# Patient Record
Sex: MALE | Race: WHITE | Employment: OTHER | URBAN - METROPOLITAN AREA
[De-identification: names, ages, dates, MRNs, and addresses within clinical notes are randomized per-mention and may not be internally consistent; named-entity substitution may affect disease eponyms.]

---

## 2018-01-01 ENCOUNTER — APPOINTMENT (OUTPATIENT)
Dept: GENERAL RADIOLOGY | Age: 78
DRG: 207 | End: 2018-01-01
Attending: HOSPITALIST
Payer: MEDICARE

## 2018-01-01 ENCOUNTER — APPOINTMENT (OUTPATIENT)
Dept: GENERAL RADIOLOGY | Age: 78
DRG: 207 | End: 2018-01-01
Attending: INTERNAL MEDICINE
Payer: MEDICARE

## 2018-01-01 ENCOUNTER — APPOINTMENT (OUTPATIENT)
Dept: CT IMAGING | Age: 78
DRG: 207 | End: 2018-01-01
Attending: INTERNAL MEDICINE
Payer: MEDICARE

## 2018-01-01 ENCOUNTER — APPOINTMENT (OUTPATIENT)
Dept: GENERAL RADIOLOGY | Age: 78
DRG: 207 | End: 2018-01-01
Attending: FAMILY MEDICINE
Payer: MEDICARE

## 2018-01-01 ENCOUNTER — APPOINTMENT (OUTPATIENT)
Dept: CT IMAGING | Age: 78
DRG: 207 | End: 2018-01-01
Attending: EMERGENCY MEDICINE
Payer: MEDICARE

## 2018-01-01 ENCOUNTER — APPOINTMENT (OUTPATIENT)
Dept: GENERAL RADIOLOGY | Age: 78
DRG: 207 | End: 2018-01-01
Attending: EMERGENCY MEDICINE
Payer: MEDICARE

## 2018-01-01 ENCOUNTER — HOSPITAL ENCOUNTER (INPATIENT)
Age: 78
LOS: 7 days | DRG: 207 | End: 2018-04-12
Attending: EMERGENCY MEDICINE | Admitting: INTERNAL MEDICINE
Payer: MEDICARE

## 2018-01-01 ENCOUNTER — APPOINTMENT (OUTPATIENT)
Dept: GENERAL RADIOLOGY | Age: 78
DRG: 207 | End: 2018-01-01
Attending: ANESTHESIOLOGY
Payer: MEDICARE

## 2018-01-01 ENCOUNTER — APPOINTMENT (OUTPATIENT)
Dept: MRI IMAGING | Age: 78
DRG: 207 | End: 2018-01-01
Attending: PSYCHIATRY & NEUROLOGY
Payer: MEDICARE

## 2018-01-01 VITALS
WEIGHT: 150.8 LBS | DIASTOLIC BLOOD PRESSURE: 54 MMHG | HEART RATE: 73 BPM | BODY MASS INDEX: 19.99 KG/M2 | OXYGEN SATURATION: 52 % | RESPIRATION RATE: 14 BRPM | HEIGHT: 73 IN | SYSTOLIC BLOOD PRESSURE: 110 MMHG | TEMPERATURE: 98.3 F

## 2018-01-01 DIAGNOSIS — I95.9 HYPOTENSION, UNSPECIFIED HYPOTENSION TYPE: ICD-10-CM

## 2018-01-01 DIAGNOSIS — R91.8 BILATERAL PULMONARY INFILTRATES ON CHEST X-RAY: ICD-10-CM

## 2018-01-01 DIAGNOSIS — J18.9 PNEUMONIA DUE TO INFECTIOUS ORGANISM, UNSPECIFIED LATERALITY, UNSPECIFIED PART OF LUNG: ICD-10-CM

## 2018-01-01 DIAGNOSIS — G93.40 ENCEPHALOPATHY: ICD-10-CM

## 2018-01-01 DIAGNOSIS — E87.79 OTHER HYPERVOLEMIA: ICD-10-CM

## 2018-01-01 DIAGNOSIS — R78.81 BACTEREMIA: ICD-10-CM

## 2018-01-01 DIAGNOSIS — J96.01 ACUTE RESPIRATORY FAILURE WITH HYPOXIA (HCC): ICD-10-CM

## 2018-01-01 DIAGNOSIS — I46.9 CARDIAC ARREST (HCC): ICD-10-CM

## 2018-01-01 DIAGNOSIS — J18.9 PNEUMONIA OF RIGHT LOWER LOBE DUE TO INFECTIOUS ORGANISM: ICD-10-CM

## 2018-01-01 DIAGNOSIS — J93.9 PNEUMOTHORAX ON LEFT: ICD-10-CM

## 2018-01-01 DIAGNOSIS — J90 PLEURAL EFFUSION: Primary | ICD-10-CM

## 2018-01-01 LAB
ALBUMIN SERPL-MCNC: 2.3 G/DL (ref 3.2–4.6)
ALBUMIN SERPL-MCNC: 3.1 G/DL (ref 3.2–4.6)
ALBUMIN/GLOB SERPL: 0.9 {RATIO} (ref 1.2–3.5)
ALP SERPL-CCNC: 83 U/L (ref 50–136)
ALT SERPL-CCNC: 9 U/L (ref 12–65)
AMMONIA PLAS-SCNC: <10 UMOL/L (ref 11–32)
ANION GAP SERPL CALC-SCNC: 10 MMOL/L (ref 7–16)
ANION GAP SERPL CALC-SCNC: 10 MMOL/L (ref 7–16)
ANION GAP SERPL CALC-SCNC: 11 MMOL/L (ref 7–16)
ANION GAP SERPL CALC-SCNC: 12 MMOL/L (ref 7–16)
ANION GAP SERPL CALC-SCNC: 13 MMOL/L (ref 7–16)
ANION GAP SERPL CALC-SCNC: 13 MMOL/L (ref 7–16)
ANION GAP SERPL CALC-SCNC: 9 MMOL/L (ref 7–16)
APPEARANCE UR: CLEAR
ARTERIAL PATENCY WRIST A: ABNORMAL
ARTERIAL PATENCY WRIST A: POSITIVE
AST SERPL-CCNC: 18 U/L (ref 15–37)
BACTERIA SPEC CULT: ABNORMAL
BACTERIA SPEC CULT: NORMAL
BACTERIA SPEC CULT: NORMAL
BACTERIA URNS QL MICRO: 0 /HPF
BASE DEFICIT BLDA-SCNC: 0.3 MMOL/L (ref 0–2)
BASE DEFICIT BLDA-SCNC: 1.1 MMOL/L (ref 0–2)
BASE DEFICIT BLDA-SCNC: 1.6 MMOL/L (ref 0–2)
BASE DEFICIT BLDA-SCNC: 1.8 MMOL/L (ref 0–2)
BASE DEFICIT BLDA-SCNC: 10.7 MMOL/L (ref 0–2)
BASE DEFICIT BLDA-SCNC: 2.4 MMOL/L (ref 0–2)
BASE DEFICIT BLDA-SCNC: 3.2 MMOL/L (ref 0–2)
BASE DEFICIT BLDA-SCNC: 4.8 MMOL/L (ref 0–2)
BASE DEFICIT BLDA-SCNC: 8 MMOL/L (ref 0–2)
BASE EXCESS BLDA CALC-SCNC: 0 MMOL/L (ref 0–3)
BASE EXCESS BLDA CALC-SCNC: 0.7 MMOL/L (ref 0–3)
BASOPHILS # BLD: 0 K/UL (ref 0–0.2)
BASOPHILS NFR BLD: 0 % (ref 0–2)
BDY SITE: ABNORMAL
BILIRUB SERPL-MCNC: 1 MG/DL (ref 0.2–1.1)
BILIRUB UR QL: ABNORMAL
BNP SERPL-MCNC: 673 PG/ML
BNP SERPL-MCNC: 699 PG/ML
BNP SERPL-MCNC: 775 PG/ML
BUN SERPL-MCNC: 35 MG/DL (ref 8–23)
BUN SERPL-MCNC: 36 MG/DL (ref 8–23)
BUN SERPL-MCNC: 38 MG/DL (ref 8–23)
BUN SERPL-MCNC: 46 MG/DL (ref 8–23)
BUN SERPL-MCNC: 59 MG/DL (ref 8–23)
BUN SERPL-MCNC: 69 MG/DL (ref 8–23)
BUN SERPL-MCNC: 74 MG/DL (ref 8–23)
BUN SERPL-MCNC: 77 MG/DL (ref 8–23)
BUN SERPL-MCNC: 91 MG/DL (ref 8–23)
CALCIUM SERPL-MCNC: 7.2 MG/DL (ref 8.3–10.4)
CALCIUM SERPL-MCNC: 7.3 MG/DL (ref 8.3–10.4)
CALCIUM SERPL-MCNC: 7.4 MG/DL (ref 8.3–10.4)
CALCIUM SERPL-MCNC: 7.5 MG/DL (ref 8.3–10.4)
CALCIUM SERPL-MCNC: 7.9 MG/DL (ref 8.3–10.4)
CALCIUM SERPL-MCNC: 8 MG/DL (ref 8.3–10.4)
CALCIUM SERPL-MCNC: 8 MG/DL (ref 8.3–10.4)
CALCIUM SERPL-MCNC: 8.1 MG/DL (ref 8.3–10.4)
CALCIUM SERPL-MCNC: 9.3 MG/DL (ref 8.3–10.4)
CHLORIDE SERPL-SCNC: 102 MMOL/L (ref 98–107)
CHLORIDE SERPL-SCNC: 103 MMOL/L (ref 98–107)
CHLORIDE SERPL-SCNC: 106 MMOL/L (ref 98–107)
CHLORIDE SERPL-SCNC: 108 MMOL/L (ref 98–107)
CHLORIDE SERPL-SCNC: 108 MMOL/L (ref 98–107)
CHLORIDE SERPL-SCNC: 109 MMOL/L (ref 98–107)
CHLORIDE SERPL-SCNC: 109 MMOL/L (ref 98–107)
CO2 SERPL-SCNC: 24 MMOL/L (ref 21–32)
CO2 SERPL-SCNC: 25 MMOL/L (ref 21–32)
CO2 SERPL-SCNC: 25 MMOL/L (ref 21–32)
CO2 SERPL-SCNC: 26 MMOL/L (ref 21–32)
CO2 SERPL-SCNC: 29 MMOL/L (ref 21–32)
CO2 SERPL-SCNC: 33 MMOL/L (ref 21–32)
COHGB MFR BLD: 0.4 % (ref 0.5–1.5)
COLOR UR: ABNORMAL
CREAT SERPL-MCNC: 1.05 MG/DL (ref 0.8–1.5)
CREAT SERPL-MCNC: 1.32 MG/DL (ref 0.8–1.5)
CREAT SERPL-MCNC: 1.41 MG/DL (ref 0.8–1.5)
CREAT SERPL-MCNC: 1.9 MG/DL (ref 0.8–1.5)
CREAT SERPL-MCNC: 2.14 MG/DL (ref 0.8–1.5)
CREAT SERPL-MCNC: 2.16 MG/DL (ref 0.8–1.5)
CREAT SERPL-MCNC: 2.23 MG/DL (ref 0.8–1.5)
CREAT SERPL-MCNC: 2.34 MG/DL (ref 0.8–1.5)
CREAT SERPL-MCNC: 2.75 MG/DL (ref 0.8–1.5)
DIFFERENTIAL METHOD BLD: ABNORMAL
DO-HGB BLD-MCNC: 1 % (ref 0–5)
EOSINOPHIL # BLD: 0 K/UL (ref 0–0.8)
EOSINOPHIL NFR BLD: 0 % (ref 0.5–7.8)
ERYTHROCYTE [DISTWIDTH] IN BLOOD BY AUTOMATED COUNT: 13.7 % (ref 11.9–14.6)
ERYTHROCYTE [DISTWIDTH] IN BLOOD BY AUTOMATED COUNT: 13.9 % (ref 11.9–14.6)
ERYTHROCYTE [DISTWIDTH] IN BLOOD BY AUTOMATED COUNT: 13.9 % (ref 11.9–14.6)
ERYTHROCYTE [DISTWIDTH] IN BLOOD BY AUTOMATED COUNT: 14 % (ref 11.9–14.6)
ERYTHROCYTE [DISTWIDTH] IN BLOOD BY AUTOMATED COUNT: 14.1 % (ref 11.9–14.6)
ERYTHROCYTE [DISTWIDTH] IN BLOOD BY AUTOMATED COUNT: 14.1 % (ref 11.9–14.6)
ERYTHROCYTE [DISTWIDTH] IN BLOOD BY AUTOMATED COUNT: 14.2 % (ref 11.9–14.6)
FIO2 ON VENT: 100 %
FIO2 ON VENT: 45 %
FIO2 ON VENT: 80 %
GLOBULIN SER CALC-MCNC: 3.5 G/DL (ref 2.3–3.5)
GLUCOSE SERPL-MCNC: 104 MG/DL (ref 65–100)
GLUCOSE SERPL-MCNC: 139 MG/DL (ref 65–100)
GLUCOSE SERPL-MCNC: 140 MG/DL (ref 65–100)
GLUCOSE SERPL-MCNC: 149 MG/DL (ref 65–100)
GLUCOSE SERPL-MCNC: 165 MG/DL (ref 65–100)
GLUCOSE SERPL-MCNC: 178 MG/DL (ref 65–100)
GLUCOSE SERPL-MCNC: 183 MG/DL (ref 65–100)
GLUCOSE SERPL-MCNC: 236 MG/DL (ref 65–100)
GLUCOSE SERPL-MCNC: 307 MG/DL (ref 65–100)
GLUCOSE UR STRIP.AUTO-MCNC: NEGATIVE MG/DL
GRAM STN SPEC: ABNORMAL
GRAM STN SPEC: NORMAL
HCO3 BLDA-SCNC: 19 MMOL/L (ref 22–26)
HCO3 BLDA-SCNC: 21 MMOL/L (ref 22–26)
HCO3 BLDA-SCNC: 22 MMOL/L (ref 22–26)
HCO3 BLDA-SCNC: 22 MMOL/L (ref 22–26)
HCO3 BLDA-SCNC: 23 MMOL/L (ref 22–26)
HCO3 BLDA-SCNC: 24 MMOL/L (ref 22–26)
HCO3 BLDA-SCNC: 24 MMOL/L (ref 22–26)
HCO3 BLDA-SCNC: 26 MMOL/L (ref 22–26)
HCO3 BLDA-SCNC: 27 MMOL/L (ref 22–26)
HCT VFR BLD AUTO: 24.4 % (ref 41.1–50.3)
HCT VFR BLD AUTO: 26.4 % (ref 41.1–50.3)
HCT VFR BLD AUTO: 33.5 % (ref 41.1–50.3)
HCT VFR BLD AUTO: 34.5 % (ref 41.1–50.3)
HCT VFR BLD AUTO: 34.5 % (ref 41.1–50.3)
HCT VFR BLD AUTO: 35.4 % (ref 41.1–50.3)
HCT VFR BLD AUTO: 38.1 % (ref 41.1–50.3)
HCT VFR BLD AUTO: 38.2 % (ref 41.1–50.3)
HCT VFR BLD AUTO: 44.6 % (ref 41.1–50.3)
HGB BLD-MCNC: 11.1 G/DL (ref 13.6–17.2)
HGB BLD-MCNC: 11.3 G/DL (ref 13.6–17.2)
HGB BLD-MCNC: 11.4 G/DL (ref 13.6–17.2)
HGB BLD-MCNC: 11.5 G/DL (ref 13.6–17.2)
HGB BLD-MCNC: 12.3 G/DL (ref 13.6–17.2)
HGB BLD-MCNC: 12.5 G/DL (ref 13.6–17.2)
HGB BLD-MCNC: 14.5 G/DL (ref 13.6–17.2)
HGB BLD-MCNC: 8.2 G/DL (ref 13.6–17.2)
HGB BLD-MCNC: 8.5 G/DL (ref 13.6–17.2)
HGB BLDMV-MCNC: 13.4 GM/DL (ref 11.7–15)
HGB UR QL STRIP: NEGATIVE
IMM GRANULOCYTES # BLD: 0 K/UL (ref 0–0.5)
IMM GRANULOCYTES # BLD: 0.1 K/UL (ref 0–0.5)
IMM GRANULOCYTES NFR BLD AUTO: 0 % (ref 0–5)
IMM GRANULOCYTES NFR BLD AUTO: 1 % (ref 0–5)
KETONES UR QL STRIP.AUTO: ABNORMAL MG/DL
LACTATE SERPL-SCNC: 1.6 MMOL/L (ref 0.4–2)
LACTATE SERPL-SCNC: 2.7 MMOL/L (ref 0.4–2)
LACTATE SERPL-SCNC: 3.8 MMOL/L (ref 0.4–2)
LACTATE SERPL-SCNC: 4.7 MMOL/L (ref 0.4–2)
LEUKOCYTE ESTERASE UR QL STRIP.AUTO: ABNORMAL
LYMPHOCYTES # BLD: 0.1 K/UL (ref 0.5–4.6)
LYMPHOCYTES # BLD: 0.1 K/UL (ref 0.5–4.6)
LYMPHOCYTES # BLD: 0.3 K/UL (ref 0.5–4.6)
LYMPHOCYTES # BLD: 0.8 K/UL (ref 0.5–4.6)
LYMPHOCYTES NFR BLD: 1 % (ref 13–44)
LYMPHOCYTES NFR BLD: 1 % (ref 13–44)
LYMPHOCYTES NFR BLD: 2 % (ref 13–44)
LYMPHOCYTES NFR BLD: 6 % (ref 13–44)
MAGNESIUM SERPL-MCNC: 1.3 MG/DL (ref 1.8–2.4)
MAGNESIUM SERPL-MCNC: 1.7 MG/DL (ref 1.8–2.4)
MAGNESIUM SERPL-MCNC: 1.7 MG/DL (ref 1.8–2.4)
MAGNESIUM SERPL-MCNC: 1.9 MG/DL (ref 1.8–2.4)
MAGNESIUM SERPL-MCNC: 2.2 MG/DL (ref 1.8–2.4)
MCH RBC QN AUTO: 31.6 PG (ref 26.1–32.9)
MCH RBC QN AUTO: 31.6 PG (ref 26.1–32.9)
MCH RBC QN AUTO: 31.7 PG (ref 26.1–32.9)
MCH RBC QN AUTO: 31.8 PG (ref 26.1–32.9)
MCH RBC QN AUTO: 31.9 PG (ref 26.1–32.9)
MCH RBC QN AUTO: 31.9 PG (ref 26.1–32.9)
MCH RBC QN AUTO: 32.3 PG (ref 26.1–32.9)
MCHC RBC AUTO-ENTMCNC: 32.2 G/DL (ref 31.4–35)
MCHC RBC AUTO-ENTMCNC: 32.2 G/DL (ref 31.4–35)
MCHC RBC AUTO-ENTMCNC: 32.5 G/DL (ref 31.4–35)
MCHC RBC AUTO-ENTMCNC: 32.5 G/DL (ref 31.4–35)
MCHC RBC AUTO-ENTMCNC: 32.8 G/DL (ref 31.4–35)
MCHC RBC AUTO-ENTMCNC: 32.8 G/DL (ref 31.4–35)
MCHC RBC AUTO-ENTMCNC: 33 G/DL (ref 31.4–35)
MCHC RBC AUTO-ENTMCNC: 33.1 G/DL (ref 31.4–35)
MCHC RBC AUTO-ENTMCNC: 33.6 G/DL (ref 31.4–35)
MCV RBC AUTO: 94.2 FL (ref 79.6–97.8)
MCV RBC AUTO: 95.8 FL (ref 79.6–97.8)
MCV RBC AUTO: 96.3 FL (ref 79.6–97.8)
MCV RBC AUTO: 96.9 FL (ref 79.6–97.8)
MCV RBC AUTO: 97.3 FL (ref 79.6–97.8)
MCV RBC AUTO: 97.8 FL (ref 79.6–97.8)
MCV RBC AUTO: 98.1 FL (ref 79.6–97.8)
MCV RBC AUTO: 98.4 FL (ref 79.6–97.8)
MCV RBC AUTO: 99.2 FL (ref 79.6–97.8)
METHGB MFR BLD: 0.3 % (ref 0–1.5)
MM INDURATION POC: 0 MM (ref 0–5)
MM INDURATION POC: 0 MM (ref 0–5)
MM INDURATION POC: NORMAL MM (ref 0–5)
MONOCYTES # BLD: 0.4 K/UL (ref 0.1–1.3)
MONOCYTES # BLD: 0.5 K/UL (ref 0.1–1.3)
MONOCYTES # BLD: 0.8 K/UL (ref 0.1–1.3)
MONOCYTES # BLD: 1.3 K/UL (ref 0.1–1.3)
MONOCYTES NFR BLD: 11 % (ref 4–12)
MONOCYTES NFR BLD: 4 % (ref 4–12)
MONOCYTES NFR BLD: 4 % (ref 4–12)
MONOCYTES NFR BLD: 6 % (ref 4–12)
NEUTS SEG # BLD: 10 K/UL (ref 1.7–8.2)
NEUTS SEG # BLD: 12.5 K/UL (ref 1.7–8.2)
NEUTS SEG # BLD: 12.7 K/UL (ref 1.7–8.2)
NEUTS SEG # BLD: 9.6 K/UL (ref 1.7–8.2)
NEUTS SEG NFR BLD: 83 % (ref 43–78)
NEUTS SEG NFR BLD: 92 % (ref 43–78)
NEUTS SEG NFR BLD: 94 % (ref 43–78)
NEUTS SEG NFR BLD: 95 % (ref 43–78)
NITRITE UR QL STRIP.AUTO: NEGATIVE
OXYHGB MFR BLDA: 98.3 % (ref 94–97)
PCO2 BLDA: 32 MMHG (ref 35–45)
PCO2 BLDA: 33 MMHG (ref 35–45)
PCO2 BLDA: 34 MMHG (ref 35–45)
PCO2 BLDA: 35 MMHG (ref 35–45)
PCO2 BLDA: 37 MMHG (ref 35–45)
PCO2 BLDA: 38 MMHG (ref 35–45)
PCO2 BLDA: 41 MMHG (ref 35–45)
PCO2 BLDA: 49 MMHG (ref 35–45)
PCO2 BLDA: 51 MMHG (ref 35–45)
PCO2 BLDA: 52 MMHG (ref 35–45)
PCO2 BLDA: 56 MMHG (ref 35–45)
PEEP RESPIRATORY: 5 CM[H2O]
PEEP RESPIRATORY: 5 CM[H2O]
PEEP RESPIRATORY: 8 CM[H2O]
PH BLDA: 7.14 [PH] (ref 7.35–7.45)
PH BLDA: 7.27 [PH] (ref 7.35–7.45)
PH BLDA: 7.29 [PH] (ref 7.35–7.45)
PH BLDA: 7.33 [PH] (ref 7.35–7.45)
PH BLDA: 7.36 [PH] (ref 7.35–7.45)
PH BLDA: 7.38 [PH] (ref 7.35–7.45)
PH BLDA: 7.4 [PH] (ref 7.35–7.45)
PH BLDA: 7.41 [PH] (ref 7.35–7.45)
PH BLDA: 7.41 [PH] (ref 7.35–7.45)
PH BLDA: 7.42 [PH] (ref 7.35–7.45)
PH BLDA: 7.45 [PH] (ref 7.35–7.45)
PH UR STRIP: 7 [PH] (ref 5–9)
PHOSPHATE SERPL-MCNC: 3.9 MG/DL (ref 2.3–3.7)
PHOSPHATE SERPL-MCNC: 4.2 MG/DL (ref 2.3–3.7)
PHOSPHATE SERPL-MCNC: 6.7 MG/DL (ref 2.3–3.7)
PLATELET # BLD AUTO: 135 K/UL (ref 150–450)
PLATELET # BLD AUTO: 151 K/UL (ref 150–450)
PLATELET # BLD AUTO: 161 K/UL (ref 150–450)
PLATELET # BLD AUTO: 164 K/UL (ref 150–450)
PLATELET # BLD AUTO: 171 K/UL (ref 150–450)
PLATELET # BLD AUTO: 185 K/UL (ref 150–450)
PLATELET # BLD AUTO: 231 K/UL (ref 150–450)
PLATELET # BLD AUTO: 257 K/UL (ref 150–450)
PLATELET # BLD AUTO: 262 K/UL (ref 150–450)
PMV BLD AUTO: 10.7 FL (ref 10.8–14.1)
PMV BLD AUTO: 10.8 FL (ref 10.8–14.1)
PMV BLD AUTO: 10.9 FL (ref 10.8–14.1)
PMV BLD AUTO: 11.1 FL (ref 10.8–14.1)
PMV BLD AUTO: 11.5 FL (ref 10.8–14.1)
PMV BLD AUTO: 12.2 FL (ref 10.8–14.1)
PO2 BLDA: 112 MMHG (ref 80–105)
PO2 BLDA: 116 MMHG (ref 80–105)
PO2 BLDA: 176 MMHG (ref 80–105)
PO2 BLDA: 268 MMHG (ref 80–105)
PO2 BLDA: 64 MMHG (ref 80–105)
PO2 BLDA: 78 MMHG (ref 80–105)
PO2 BLDA: 79 MMHG (ref 80–105)
PO2 BLDA: 79 MMHG (ref 80–105)
PO2 BLDA: 81 MMHG (ref 80–105)
PO2 BLDA: 87 MMHG (ref 80–105)
PO2 BLDA: 88 MMHG (ref 80–105)
POTASSIUM SERPL-SCNC: 3.8 MMOL/L (ref 3.5–5.1)
POTASSIUM SERPL-SCNC: 3.9 MMOL/L (ref 3.5–5.1)
POTASSIUM SERPL-SCNC: 4.1 MMOL/L (ref 3.5–5.1)
POTASSIUM SERPL-SCNC: 4.1 MMOL/L (ref 3.5–5.1)
POTASSIUM SERPL-SCNC: 4.2 MMOL/L (ref 3.5–5.1)
POTASSIUM SERPL-SCNC: 4.3 MMOL/L (ref 3.5–5.1)
POTASSIUM SERPL-SCNC: 4.4 MMOL/L (ref 3.5–5.1)
POTASSIUM SERPL-SCNC: 4.6 MMOL/L (ref 3.5–5.1)
POTASSIUM SERPL-SCNC: 4.9 MMOL/L (ref 3.5–5.1)
PPD POC: NEGATIVE NEGATIVE
PPD POC: NEGATIVE NEGATIVE
PPD POC: NORMAL NEGATIVE
PROCALCITONIN SERPL-MCNC: 5.6 NG/ML
PROT SERPL-MCNC: 6.6 G/DL (ref 6.3–8.2)
PROT UR STRIP-MCNC: NEGATIVE MG/DL
RBC # BLD AUTO: 2.59 M/UL (ref 4.23–5.67)
RBC # BLD AUTO: 2.69 M/UL (ref 4.23–5.67)
RBC # BLD AUTO: 3.48 M/UL (ref 4.23–5.67)
RBC # BLD AUTO: 3.56 M/UL (ref 4.23–5.67)
RBC # BLD AUTO: 3.6 M/UL (ref 4.23–5.67)
RBC # BLD AUTO: 3.64 M/UL (ref 4.23–5.67)
RBC # BLD AUTO: 3.85 M/UL (ref 4.23–5.67)
RBC # BLD AUTO: 3.87 M/UL (ref 4.23–5.67)
RBC # BLD AUTO: 4.56 M/UL (ref 4.23–5.67)
RBC #/AREA URNS HPF: ABNORMAL /HPF
RESP RATE: 13
RESP RATE: 15
SAO2 % BLD: 99 % (ref 92–98.5)
SERVICE CMNT-IMP: ABNORMAL
SERVICE CMNT-IMP: NORMAL
SERVICE CMNT-IMP: NORMAL
SODIUM SERPL-SCNC: 142 MMOL/L (ref 136–145)
SODIUM SERPL-SCNC: 142 MMOL/L (ref 136–145)
SODIUM SERPL-SCNC: 144 MMOL/L (ref 136–145)
SODIUM SERPL-SCNC: 145 MMOL/L (ref 136–145)
SODIUM SERPL-SCNC: 147 MMOL/L (ref 136–145)
SODIUM SERPL-SCNC: 147 MMOL/L (ref 136–145)
SP GR UR REFRACTOMETRY: 1.02 (ref 1–1.02)
TROPONIN I SERPL-MCNC: 1.68 NG/ML (ref 0.02–0.05)
TROPONIN I SERPL-MCNC: 11.49 NG/ML (ref 0.02–0.05)
TROPONIN I SERPL-MCNC: 3.4 NG/ML (ref 0.02–0.05)
UROBILINOGEN UR QL STRIP.AUTO: 1 EU/DL (ref 0.2–1)
VANCOMYCIN TROUGH SERPL-MCNC: 26.6 UG/ML (ref 5–20)
VENTILATION MODE VENT: ABNORMAL
VT SETTING VENT: 400 ML
VT SETTING VENT: 408 ML
VT SETTING VENT: 500 ML
VT SETTING VENT: 520 ML
VT SETTING VENT: 550 ML
WBC # BLD AUTO: 10.2 K/UL (ref 4.3–11.1)
WBC # BLD AUTO: 12 K/UL (ref 4.3–11.1)
WBC # BLD AUTO: 13.2 K/UL (ref 4.3–11.1)
WBC # BLD AUTO: 13.2 K/UL (ref 4.3–11.1)
WBC # BLD AUTO: 13.8 K/UL (ref 4.3–11.1)
WBC # BLD AUTO: 13.8 K/UL (ref 4.3–11.1)
WBC # BLD AUTO: 14.1 K/UL (ref 4.3–11.1)
WBC # BLD AUTO: 17 K/UL (ref 4.3–11.1)
WBC # BLD AUTO: 17.6 K/UL (ref 4.3–11.1)
WBC URNS QL MICRO: ABNORMAL /HPF

## 2018-01-01 PROCEDURE — 74011000258 HC RX REV CODE- 258: Performed by: FAMILY MEDICINE

## 2018-01-01 PROCEDURE — 74011000258 HC RX REV CODE- 258: Performed by: INTERNAL MEDICINE

## 2018-01-01 PROCEDURE — 74011000250 HC RX REV CODE- 250: Performed by: FAMILY MEDICINE

## 2018-01-01 PROCEDURE — 74011250636 HC RX REV CODE- 250/636: Performed by: FAMILY MEDICINE

## 2018-01-01 PROCEDURE — 83605 ASSAY OF LACTIC ACID: CPT | Performed by: FAMILY MEDICINE

## 2018-01-01 PROCEDURE — 95816 EEG AWAKE AND DROWSY: CPT | Performed by: FAMILY MEDICINE

## 2018-01-01 PROCEDURE — 84100 ASSAY OF PHOSPHORUS: CPT | Performed by: FAMILY MEDICINE

## 2018-01-01 PROCEDURE — 83735 ASSAY OF MAGNESIUM: CPT | Performed by: FAMILY MEDICINE

## 2018-01-01 PROCEDURE — 99223 1ST HOSP IP/OBS HIGH 75: CPT | Performed by: INTERNAL MEDICINE

## 2018-01-01 PROCEDURE — 71046 X-RAY EXAM CHEST 2 VIEWS: CPT

## 2018-01-01 PROCEDURE — 85027 COMPLETE CBC AUTOMATED: CPT | Performed by: FAMILY MEDICINE

## 2018-01-01 PROCEDURE — C9113 INJ PANTOPRAZOLE SODIUM, VIA: HCPCS | Performed by: HOSPITALIST

## 2018-01-01 PROCEDURE — 74011250637 HC RX REV CODE- 250/637: Performed by: INTERNAL MEDICINE

## 2018-01-01 PROCEDURE — 36600 WITHDRAWAL OF ARTERIAL BLOOD: CPT

## 2018-01-01 PROCEDURE — 84484 ASSAY OF TROPONIN QUANT: CPT | Performed by: INTERNAL MEDICINE

## 2018-01-01 PROCEDURE — 65610000001 HC ROOM ICU GENERAL

## 2018-01-01 PROCEDURE — 94002 VENT MGMT INPAT INIT DAY: CPT

## 2018-01-01 PROCEDURE — 80048 BASIC METABOLIC PNL TOTAL CA: CPT | Performed by: FAMILY MEDICINE

## 2018-01-01 PROCEDURE — 94003 VENT MGMT INPAT SUBQ DAY: CPT

## 2018-01-01 PROCEDURE — 74011250636 HC RX REV CODE- 250/636: Performed by: INTERNAL MEDICINE

## 2018-01-01 PROCEDURE — 71045 X-RAY EXAM CHEST 1 VIEW: CPT

## 2018-01-01 PROCEDURE — 74011250637 HC RX REV CODE- 250/637: Performed by: FAMILY MEDICINE

## 2018-01-01 PROCEDURE — 82803 BLOOD GASES ANY COMBINATION: CPT

## 2018-01-01 PROCEDURE — 74011250636 HC RX REV CODE- 250/636: Performed by: HOSPITALIST

## 2018-01-01 PROCEDURE — 36592 COLLECT BLOOD FROM PICC: CPT

## 2018-01-01 PROCEDURE — 87077 CULTURE AEROBIC IDENTIFY: CPT | Performed by: EMERGENCY MEDICINE

## 2018-01-01 PROCEDURE — 74011000250 HC RX REV CODE- 250: Performed by: INTERNAL MEDICINE

## 2018-01-01 PROCEDURE — 87040 BLOOD CULTURE FOR BACTERIA: CPT | Performed by: HOSPITALIST

## 2018-01-01 PROCEDURE — 87070 CULTURE OTHR SPECIMN AEROBIC: CPT | Performed by: INTERNAL MEDICINE

## 2018-01-01 PROCEDURE — 94640 AIRWAY INHALATION TREATMENT: CPT

## 2018-01-01 PROCEDURE — 74011250637 HC RX REV CODE- 250/637: Performed by: NURSE PRACTITIONER

## 2018-01-01 PROCEDURE — 74011000250 HC RX REV CODE- 250: Performed by: HOSPITALIST

## 2018-01-01 PROCEDURE — C8929 TTE W OR WO FOL WCON,DOPPLER: HCPCS

## 2018-01-01 PROCEDURE — 74018 RADEX ABDOMEN 1 VIEW: CPT

## 2018-01-01 PROCEDURE — 85025 COMPLETE CBC W/AUTO DIFF WBC: CPT | Performed by: FAMILY MEDICINE

## 2018-01-01 PROCEDURE — 5A1945Z RESPIRATORY VENTILATION, 24-96 CONSECUTIVE HOURS: ICD-10-PCS | Performed by: ANESTHESIOLOGY

## 2018-01-01 PROCEDURE — 0BH17EZ INSERTION OF ENDOTRACHEAL AIRWAY INTO TRACHEA, VIA NATURAL OR ARTIFICIAL OPENING: ICD-10-PCS | Performed by: ANESTHESIOLOGY

## 2018-01-01 PROCEDURE — 77030018798 HC PMP KT ENTRL FED COVD -A

## 2018-01-01 PROCEDURE — 74011000250 HC RX REV CODE- 250: Performed by: PSYCHIATRY & NEUROLOGY

## 2018-01-01 PROCEDURE — 74011250636 HC RX REV CODE- 250/636: Performed by: PSYCHIATRY & NEUROLOGY

## 2018-01-01 PROCEDURE — 85025 COMPLETE CBC W/AUTO DIFF WBC: CPT | Performed by: EMERGENCY MEDICINE

## 2018-01-01 PROCEDURE — 5A1955Z RESPIRATORY VENTILATION, GREATER THAN 96 CONSECUTIVE HOURS: ICD-10-PCS | Performed by: INTERNAL MEDICINE

## 2018-01-01 PROCEDURE — 99284 EMERGENCY DEPT VISIT MOD MDM: CPT | Performed by: EMERGENCY MEDICINE

## 2018-01-01 PROCEDURE — 77030012793 HC CIRC VNTLTR FISP -B

## 2018-01-01 PROCEDURE — 74011000302 HC RX REV CODE- 302: Performed by: INTERNAL MEDICINE

## 2018-01-01 PROCEDURE — 5A12012 PERFORMANCE OF CARDIAC OUTPUT, SINGLE, MANUAL: ICD-10-PCS | Performed by: INTERNAL MEDICINE

## 2018-01-01 PROCEDURE — C1729 CATH, DRAINAGE: HCPCS

## 2018-01-01 PROCEDURE — 77030013140 HC MSK NEB VYRM -A

## 2018-01-01 PROCEDURE — 86580 TB INTRADERMAL TEST: CPT | Performed by: INTERNAL MEDICINE

## 2018-01-01 PROCEDURE — 74011000258 HC RX REV CODE- 258: Performed by: EMERGENCY MEDICINE

## 2018-01-01 PROCEDURE — 82040 ASSAY OF SERUM ALBUMIN: CPT | Performed by: INTERNAL MEDICINE

## 2018-01-01 PROCEDURE — 71260 CT THORAX DX C+: CPT

## 2018-01-01 PROCEDURE — 99233 SBSQ HOSP IP/OBS HIGH 50: CPT | Performed by: INTERNAL MEDICINE

## 2018-01-01 PROCEDURE — 87040 BLOOD CULTURE FOR BACTERIA: CPT | Performed by: EMERGENCY MEDICINE

## 2018-01-01 PROCEDURE — C1751 CATH, INF, PER/CENT/MIDLINE: HCPCS

## 2018-01-01 PROCEDURE — 83880 ASSAY OF NATRIURETIC PEPTIDE: CPT | Performed by: FAMILY MEDICINE

## 2018-01-01 PROCEDURE — 74011250637 HC RX REV CODE- 250/637: Performed by: EMERGENCY MEDICINE

## 2018-01-01 PROCEDURE — 83605 ASSAY OF LACTIC ACID: CPT | Performed by: INTERNAL MEDICINE

## 2018-01-01 PROCEDURE — 77030021668 HC NEB PREFIL KT VYRM -A

## 2018-01-01 PROCEDURE — 74011000258 HC RX REV CODE- 258: Performed by: PSYCHIATRY & NEUROLOGY

## 2018-01-01 PROCEDURE — 87186 SC STD MICRODIL/AGAR DIL: CPT | Performed by: EMERGENCY MEDICINE

## 2018-01-01 PROCEDURE — 77030011256 HC DRSG MEPILEX <16IN NO BORD MOLN -A

## 2018-01-01 PROCEDURE — 77030018719 HC DRSG PTCH ANTIMIC J&J -A

## 2018-01-01 PROCEDURE — 84145 PROCALCITONIN (PCT): CPT | Performed by: INTERNAL MEDICINE

## 2018-01-01 PROCEDURE — 77030019605

## 2018-01-01 PROCEDURE — 85027 COMPLETE CBC AUTOMATED: CPT | Performed by: INTERNAL MEDICINE

## 2018-01-01 PROCEDURE — 70450 CT HEAD/BRAIN W/O DYE: CPT

## 2018-01-01 PROCEDURE — 80053 COMPREHEN METABOLIC PANEL: CPT | Performed by: EMERGENCY MEDICINE

## 2018-01-01 PROCEDURE — 81001 URINALYSIS AUTO W/SCOPE: CPT | Performed by: INTERNAL MEDICINE

## 2018-01-01 PROCEDURE — 74011636320 HC RX REV CODE- 636/320: Performed by: EMERGENCY MEDICINE

## 2018-01-01 PROCEDURE — 93005 ELECTROCARDIOGRAM TRACING: CPT | Performed by: INTERNAL MEDICINE

## 2018-01-01 PROCEDURE — B543ZZA ULTRASONOGRAPHY OF RIGHT JUGULAR VEINS, GUIDANCE: ICD-10-PCS | Performed by: RADIOLOGY

## 2018-01-01 PROCEDURE — 77030004950 HC CATH ENTRL NG COVD -A

## 2018-01-01 PROCEDURE — 96374 THER/PROPH/DIAG INJ IV PUSH: CPT | Performed by: EMERGENCY MEDICINE

## 2018-01-01 PROCEDURE — 77030005402 HC CATH RAD ART LN KT TELE -B

## 2018-01-01 PROCEDURE — 84484 ASSAY OF TROPONIN QUANT: CPT | Performed by: FAMILY MEDICINE

## 2018-01-01 PROCEDURE — 83880 ASSAY OF NATRIURETIC PEPTIDE: CPT | Performed by: INTERNAL MEDICINE

## 2018-01-01 PROCEDURE — 96375 TX/PRO/DX INJ NEW DRUG ADDON: CPT | Performed by: EMERGENCY MEDICINE

## 2018-01-01 PROCEDURE — 05HM33Z INSERTION OF INFUSION DEVICE INTO RIGHT INTERNAL JUGULAR VEIN, PERCUTANEOUS APPROACH: ICD-10-PCS | Performed by: RADIOLOGY

## 2018-01-01 PROCEDURE — 99291 CRITICAL CARE FIRST HOUR: CPT | Performed by: INTERNAL MEDICINE

## 2018-01-01 PROCEDURE — 80048 BASIC METABOLIC PNL TOTAL CA: CPT | Performed by: INTERNAL MEDICINE

## 2018-01-01 PROCEDURE — 80202 ASSAY OF VANCOMYCIN: CPT | Performed by: INTERNAL MEDICINE

## 2018-01-01 PROCEDURE — 82140 ASSAY OF AMMONIA: CPT | Performed by: PSYCHIATRY & NEUROLOGY

## 2018-01-01 PROCEDURE — 36415 COLL VENOUS BLD VENIPUNCTURE: CPT | Performed by: FAMILY MEDICINE

## 2018-01-01 PROCEDURE — 74011250636 HC RX REV CODE- 250/636: Performed by: EMERGENCY MEDICINE

## 2018-01-01 PROCEDURE — 96361 HYDRATE IV INFUSION ADD-ON: CPT | Performed by: EMERGENCY MEDICINE

## 2018-01-01 PROCEDURE — 87205 SMEAR GRAM STAIN: CPT | Performed by: EMERGENCY MEDICINE

## 2018-01-01 PROCEDURE — 74011000250 HC RX REV CODE- 250

## 2018-01-01 PROCEDURE — 0BH17EZ INSERTION OF ENDOTRACHEAL AIRWAY INTO TRACHEA, VIA NATURAL OR ARTIFICIAL OPENING: ICD-10-PCS | Performed by: INTERNAL MEDICINE

## 2018-01-01 PROCEDURE — 76450000000

## 2018-01-01 RX ORDER — ALBUTEROL SULFATE 0.83 MG/ML
2.5 SOLUTION RESPIRATORY (INHALATION)
Status: DISCONTINUED | OUTPATIENT
Start: 2018-01-01 | End: 2018-01-01

## 2018-01-01 RX ORDER — GLUCOSAMINE SULFATE 1500 MG
POWDER IN PACKET (EA) ORAL DAILY
COMMUNITY

## 2018-01-01 RX ORDER — ONDANSETRON 2 MG/ML
4 INJECTION INTRAMUSCULAR; INTRAVENOUS
Status: DISCONTINUED | OUTPATIENT
Start: 2018-01-01 | End: 2018-01-01 | Stop reason: HOSPADM

## 2018-01-01 RX ORDER — NOREPINEPHRINE BITARTRATE/D5W 4MG/250ML
PLASTIC BAG, INJECTION (ML) INTRAVENOUS
Status: COMPLETED
Start: 2018-01-01 | End: 2018-01-01

## 2018-01-01 RX ORDER — MAGNESIUM SULFATE HEPTAHYDRATE 40 MG/ML
2 INJECTION, SOLUTION INTRAVENOUS ONCE
Status: COMPLETED | OUTPATIENT
Start: 2018-01-01 | End: 2018-01-01

## 2018-01-01 RX ORDER — LANOLIN ALCOHOL/MO/W.PET/CERES
400 CREAM (GRAM) TOPICAL DAILY
COMMUNITY

## 2018-01-01 RX ORDER — LIDOCAINE HCL/PF 100 MG/5ML
SYRINGE (ML) INTRAVENOUS
Status: DISCONTINUED | OUTPATIENT
Start: 2018-01-01 | End: 2018-01-01 | Stop reason: HOSPADM

## 2018-01-01 RX ORDER — SODIUM BICARBONATE 1 MEQ/ML
SYRINGE (ML) INTRAVENOUS
Status: DISCONTINUED | OUTPATIENT
Start: 2018-01-01 | End: 2018-01-01 | Stop reason: HOSPADM

## 2018-01-01 RX ORDER — FUROSEMIDE 10 MG/ML
40 INJECTION INTRAMUSCULAR; INTRAVENOUS ONCE
Status: COMPLETED | OUTPATIENT
Start: 2018-01-01 | End: 2018-01-01

## 2018-01-01 RX ORDER — MORPHINE SULFATE 2 MG/ML
2 INJECTION, SOLUTION INTRAMUSCULAR; INTRAVENOUS
Status: DISCONTINUED | OUTPATIENT
Start: 2018-01-01 | End: 2018-01-01 | Stop reason: HOSPADM

## 2018-01-01 RX ORDER — SODIUM CHLORIDE 0.9 % (FLUSH) 0.9 %
5-10 SYRINGE (ML) INJECTION EVERY 8 HOURS
Status: DISCONTINUED | OUTPATIENT
Start: 2018-01-01 | End: 2018-01-01 | Stop reason: HOSPADM

## 2018-01-01 RX ORDER — SODIUM CHLORIDE 9 MG/ML
75 INJECTION, SOLUTION INTRAVENOUS CONTINUOUS
Status: DISCONTINUED | OUTPATIENT
Start: 2018-01-01 | End: 2018-01-01

## 2018-01-01 RX ORDER — VANCOMYCIN/0.9 % SOD CHLORIDE 1.5G/250ML
1500 PLASTIC BAG, INJECTION (ML) INTRAVENOUS ONCE
Status: COMPLETED | OUTPATIENT
Start: 2018-01-01 | End: 2018-01-01

## 2018-01-01 RX ORDER — HYDROCODONE BITARTRATE AND ACETAMINOPHEN 5; 325 MG/1; MG/1
1 TABLET ORAL
Status: DISCONTINUED | OUTPATIENT
Start: 2018-01-01 | End: 2018-01-01 | Stop reason: HOSPADM

## 2018-01-01 RX ORDER — TIMOLOL MALEATE 5 MG/ML
1 SOLUTION/ DROPS OPHTHALMIC DAILY
Status: DISCONTINUED | OUTPATIENT
Start: 2018-01-01 | End: 2018-01-01 | Stop reason: HOSPADM

## 2018-01-01 RX ORDER — ACETAMINOPHEN 325 MG/1
650 TABLET ORAL
Status: DISCONTINUED | OUTPATIENT
Start: 2018-01-01 | End: 2018-01-01 | Stop reason: HOSPADM

## 2018-01-01 RX ORDER — IPRATROPIUM BROMIDE AND ALBUTEROL SULFATE 2.5; .5 MG/3ML; MG/3ML
3 SOLUTION RESPIRATORY (INHALATION)
Status: DISCONTINUED | OUTPATIENT
Start: 2018-01-01 | End: 2018-01-01 | Stop reason: HOSPADM

## 2018-01-01 RX ORDER — FUROSEMIDE 10 MG/ML
80 INJECTION INTRAMUSCULAR; INTRAVENOUS ONCE
Status: COMPLETED | OUTPATIENT
Start: 2018-01-01 | End: 2018-01-01

## 2018-01-01 RX ORDER — VANCOMYCIN/0.9 % SOD CHLORIDE 1.5G/250ML
1500 PLASTIC BAG, INJECTION (ML) INTRAVENOUS EVERY 24 HOURS
Status: DISCONTINUED | OUTPATIENT
Start: 2018-01-01 | End: 2018-01-01 | Stop reason: DRUGHIGH

## 2018-01-01 RX ORDER — SODIUM CHLORIDE 0.9 % (FLUSH) 0.9 %
10 SYRINGE (ML) INJECTION
Status: COMPLETED | OUTPATIENT
Start: 2018-01-01 | End: 2018-01-01

## 2018-01-01 RX ORDER — HYOSCYAMINE SULFATE 0.12 MG/1
0.25 TABLET SUBLINGUAL
Status: COMPLETED | OUTPATIENT
Start: 2018-01-01 | End: 2018-01-01

## 2018-01-01 RX ORDER — ASPIRIN 325 MG
325 TABLET ORAL DAILY
Status: DISCONTINUED | OUTPATIENT
Start: 2018-01-01 | End: 2018-01-01 | Stop reason: HOSPADM

## 2018-01-01 RX ORDER — METOPROLOL TARTRATE 25 MG/1
12.5 TABLET, FILM COATED ORAL 2 TIMES DAILY
Status: DISCONTINUED | OUTPATIENT
Start: 2018-01-01 | End: 2018-01-01 | Stop reason: HOSPADM

## 2018-01-01 RX ORDER — MAGNESIUM SULFATE 1 G/100ML
1 INJECTION INTRAVENOUS ONCE
Status: COMPLETED | OUTPATIENT
Start: 2018-01-01 | End: 2018-01-01

## 2018-01-01 RX ORDER — AMIODARONE HYDROCHLORIDE 150 MG/3ML
INJECTION, SOLUTION INTRAVENOUS
Status: DISCONTINUED | OUTPATIENT
Start: 2018-01-01 | End: 2018-01-01 | Stop reason: HOSPADM

## 2018-01-01 RX ORDER — CALCIUM CHLORIDE INJECTION 100 MG/ML
INJECTION, SOLUTION INTRAVENOUS
Status: DISCONTINUED | OUTPATIENT
Start: 2018-01-01 | End: 2018-01-01 | Stop reason: HOSPADM

## 2018-01-01 RX ORDER — PROPOFOL 10 MG/ML
0-50 VIAL (ML) INTRAVENOUS
Status: DISCONTINUED | OUTPATIENT
Start: 2018-01-01 | End: 2018-01-01

## 2018-01-01 RX ORDER — HEPARIN SODIUM 5000 [USP'U]/ML
5000 INJECTION, SOLUTION INTRAVENOUS; SUBCUTANEOUS EVERY 8 HOURS
Status: DISCONTINUED | OUTPATIENT
Start: 2018-01-01 | End: 2018-01-01 | Stop reason: HOSPADM

## 2018-01-01 RX ORDER — ASPIRIN 300 MG/1
300 SUPPOSITORY RECTAL DAILY
Status: DISCONTINUED | OUTPATIENT
Start: 2018-01-01 | End: 2018-01-01

## 2018-01-01 RX ORDER — NOREPINEPHRINE BITARTRATE/D5W 4MG/250ML
2-30 PLASTIC BAG, INJECTION (ML) INTRAVENOUS
Status: DISCONTINUED | OUTPATIENT
Start: 2018-01-01 | End: 2018-01-01

## 2018-01-01 RX ORDER — ONDANSETRON 2 MG/ML
4 INJECTION INTRAMUSCULAR; INTRAVENOUS
Status: COMPLETED | OUTPATIENT
Start: 2018-01-01 | End: 2018-01-01

## 2018-01-01 RX ORDER — SODIUM CHLORIDE 0.9 % (FLUSH) 0.9 %
5-10 SYRINGE (ML) INJECTION AS NEEDED
Status: DISCONTINUED | OUTPATIENT
Start: 2018-01-01 | End: 2018-01-01 | Stop reason: HOSPADM

## 2018-01-01 RX ORDER — LORAZEPAM 2 MG/ML
2 INJECTION INTRAMUSCULAR ONCE
Status: COMPLETED | OUTPATIENT
Start: 2018-01-01 | End: 2018-01-01

## 2018-01-01 RX ORDER — EPINEPHRINE 0.1 MG/ML
INJECTION INTRACARDIAC; INTRAVENOUS
Status: DISCONTINUED | OUTPATIENT
Start: 2018-01-01 | End: 2018-01-01 | Stop reason: HOSPADM

## 2018-01-01 RX ADMIN — SODIUM CHLORIDE 100 ML: 900 INJECTION, SOLUTION INTRAVENOUS at 19:25

## 2018-01-01 RX ADMIN — NOREPINEPHRINE BITARTRATE 10 MCG/MIN: 4 SOLUTION at 23:59

## 2018-01-01 RX ADMIN — METHYLPREDNISOLONE SODIUM SUCCINATE 40 MG: 40 INJECTION, POWDER, FOR SOLUTION INTRAMUSCULAR; INTRAVENOUS at 10:34

## 2018-01-01 RX ADMIN — HEPARIN SODIUM 5000 UNITS: 5000 INJECTION, SOLUTION INTRAVENOUS; SUBCUTANEOUS at 08:59

## 2018-01-01 RX ADMIN — Medication 150 MG: at 02:40

## 2018-01-01 RX ADMIN — NOREPINEPHRINE BITARTRATE 8 MCG/MIN: 4 SOLUTION at 14:22

## 2018-01-01 RX ADMIN — ASPIRIN 300 MG: 300 SUPPOSITORY RECTAL at 09:56

## 2018-01-01 RX ADMIN — Medication 10 ML: at 21:29

## 2018-01-01 RX ADMIN — Medication 10 ML: at 05:20

## 2018-01-01 RX ADMIN — AMIODARONE HYDROCHLORIDE 1 MG/MIN: 50 INJECTION, SOLUTION INTRAVENOUS at 03:00

## 2018-01-01 RX ADMIN — MAGNESIUM SULFATE HEPTAHYDRATE 1 G: 1 INJECTION, SOLUTION INTRAVENOUS at 15:58

## 2018-01-01 RX ADMIN — ASPIRIN 325 MG ORAL TABLET 325 MG: 325 PILL ORAL at 08:58

## 2018-01-01 RX ADMIN — VANCOMYCIN HYDROCHLORIDE 1500 MG: 10 INJECTION, POWDER, LYOPHILIZED, FOR SOLUTION INTRAVENOUS at 02:33

## 2018-01-01 RX ADMIN — MORPHINE SULFATE 2 MG: 2 INJECTION, SOLUTION INTRAMUSCULAR; INTRAVENOUS at 17:31

## 2018-01-01 RX ADMIN — EPINEPHRINE 1 MG: 0.1 INJECTION, SOLUTION ENDOTRACHEAL; INTRACARDIAC; INTRAVENOUS at 02:50

## 2018-01-01 RX ADMIN — SODIUM CHLORIDE 40 MG: 9 INJECTION INTRAMUSCULAR; INTRAVENOUS; SUBCUTANEOUS at 09:54

## 2018-01-01 RX ADMIN — TIMOLOL MALEATE 1 DROP: 5 SOLUTION OPHTHALMIC at 10:37

## 2018-01-01 RX ADMIN — Medication 10 ML: at 21:26

## 2018-01-01 RX ADMIN — EPINEPHRINE 8 MCG/MIN: 1 INJECTION PARENTERAL at 02:07

## 2018-01-01 RX ADMIN — EPINEPHRINE 1 MG: 0.1 INJECTION, SOLUTION ENDOTRACHEAL; INTRACARDIAC; INTRAVENOUS at 02:31

## 2018-01-01 RX ADMIN — Medication 10 ML: at 13:48

## 2018-01-01 RX ADMIN — Medication 10 ML: at 06:39

## 2018-01-01 RX ADMIN — PIPERACILLIN SODIUM,TAZOBACTAM SODIUM 3.38 G: 3; .375 INJECTION, POWDER, FOR SOLUTION INTRAVENOUS at 10:33

## 2018-01-01 RX ADMIN — PIPERACILLIN SODIUM,TAZOBACTAM SODIUM 3.38 G: 3; .375 INJECTION, POWDER, FOR SOLUTION INTRAVENOUS at 09:51

## 2018-01-01 RX ADMIN — METHYLPREDNISOLONE SODIUM SUCCINATE 40 MG: 40 INJECTION, POWDER, FOR SOLUTION INTRAMUSCULAR; INTRAVENOUS at 21:23

## 2018-01-01 RX ADMIN — SODIUM CHLORIDE 500 ML: 900 INJECTION, SOLUTION INTRAVENOUS at 07:22

## 2018-01-01 RX ADMIN — EPINEPHRINE 1 MG: 0.1 INJECTION, SOLUTION ENDOTRACHEAL; INTRACARDIAC; INTRAVENOUS at 02:55

## 2018-01-01 RX ADMIN — EPINEPHRINE 1 MG: 0.1 INJECTION, SOLUTION ENDOTRACHEAL; INTRACARDIAC; INTRAVENOUS at 02:29

## 2018-01-01 RX ADMIN — HEPARIN SODIUM 5000 UNITS: 5000 INJECTION, SOLUTION INTRAVENOUS; SUBCUTANEOUS at 17:09

## 2018-01-01 RX ADMIN — Medication 10 ML: at 22:34

## 2018-01-01 RX ADMIN — VANCOMYCIN HYDROCHLORIDE 1000 MG: 1 INJECTION, POWDER, LYOPHILIZED, FOR SOLUTION INTRAVENOUS at 00:38

## 2018-01-01 RX ADMIN — SODIUM CHLORIDE 40 MG: 9 INJECTION INTRAMUSCULAR; INTRAVENOUS; SUBCUTANEOUS at 10:34

## 2018-01-01 RX ADMIN — MORPHINE SULFATE 2 MG: 2 INJECTION, SOLUTION INTRAMUSCULAR; INTRAVENOUS at 10:10

## 2018-01-01 RX ADMIN — Medication 10 ML: at 06:03

## 2018-01-01 RX ADMIN — METOPROLOL TARTRATE 12.5 MG: 25 TABLET ORAL at 08:58

## 2018-01-01 RX ADMIN — FUROSEMIDE 40 MG: 10 INJECTION, SOLUTION INTRAMUSCULAR; INTRAVENOUS at 09:56

## 2018-01-01 RX ADMIN — EPINEPHRINE 1 MG: 0.1 INJECTION, SOLUTION ENDOTRACHEAL; INTRACARDIAC; INTRAVENOUS at 02:25

## 2018-01-01 RX ADMIN — CEFTRIAXONE SODIUM 2 G: 2 INJECTION, POWDER, FOR SOLUTION INTRAMUSCULAR; INTRAVENOUS at 08:10

## 2018-01-01 RX ADMIN — Medication 10 ML: at 15:48

## 2018-01-01 RX ADMIN — Medication 10 ML: at 13:21

## 2018-01-01 RX ADMIN — NOREPINEPHRINE BITARTRATE 20 MCG/MIN: 4 SOLUTION at 03:24

## 2018-01-01 RX ADMIN — EPINEPHRINE 10 MCG/MIN: 1 INJECTION PARENTERAL at 18:53

## 2018-01-01 RX ADMIN — METHYLPREDNISOLONE SODIUM SUCCINATE 40 MG: 40 INJECTION, POWDER, FOR SOLUTION INTRAMUSCULAR; INTRAVENOUS at 10:04

## 2018-01-01 RX ADMIN — PIPERACILLIN SODIUM,TAZOBACTAM SODIUM 3.38 G: 3; .375 INJECTION, POWDER, FOR SOLUTION INTRAVENOUS at 17:47

## 2018-01-01 RX ADMIN — Medication 10 ML: at 21:06

## 2018-01-01 RX ADMIN — SODIUM BICARBONATE 50 MEQ: 84 INJECTION, SOLUTION INTRAVENOUS at 02:28

## 2018-01-01 RX ADMIN — Medication 10 ML: at 22:36

## 2018-01-01 RX ADMIN — HEPARIN SODIUM 5000 UNITS: 5000 INJECTION, SOLUTION INTRAVENOUS; SUBCUTANEOUS at 00:32

## 2018-01-01 RX ADMIN — MAGNESIUM SULFATE HEPTAHYDRATE 2 G: 40 INJECTION, SOLUTION INTRAVENOUS at 11:15

## 2018-01-01 RX ADMIN — ONDANSETRON 4 MG: 2 INJECTION INTRAMUSCULAR; INTRAVENOUS at 18:14

## 2018-01-01 RX ADMIN — HEPARIN SODIUM 5000 UNITS: 5000 INJECTION, SOLUTION INTRAVENOUS; SUBCUTANEOUS at 23:13

## 2018-01-01 RX ADMIN — VANCOMYCIN HYDROCHLORIDE 1000 MG: 1 INJECTION, POWDER, LYOPHILIZED, FOR SOLUTION INTRAVENOUS at 00:41

## 2018-01-01 RX ADMIN — SODIUM CHLORIDE 40 MG: 9 INJECTION INTRAMUSCULAR; INTRAVENOUS; SUBCUTANEOUS at 09:50

## 2018-01-01 RX ADMIN — EPINEPHRINE 1 MG: 0.1 INJECTION, SOLUTION ENDOTRACHEAL; INTRACARDIAC; INTRAVENOUS at 02:21

## 2018-01-01 RX ADMIN — METHYLPREDNISOLONE SODIUM SUCCINATE 40 MG: 40 INJECTION, POWDER, FOR SOLUTION INTRAMUSCULAR; INTRAVENOUS at 09:57

## 2018-01-01 RX ADMIN — HEPARIN SODIUM 5000 UNITS: 5000 INJECTION, SOLUTION INTRAVENOUS; SUBCUTANEOUS at 09:51

## 2018-01-01 RX ADMIN — HEPARIN SODIUM 5000 UNITS: 5000 INJECTION, SOLUTION INTRAVENOUS; SUBCUTANEOUS at 08:04

## 2018-01-01 RX ADMIN — Medication 10 ML: at 19:25

## 2018-01-01 RX ADMIN — CEFTRIAXONE 1 G: 1 INJECTION, POWDER, FOR SOLUTION INTRAMUSCULAR; INTRAVENOUS at 20:15

## 2018-01-01 RX ADMIN — EPINEPHRINE 1 MG: 0.1 INJECTION, SOLUTION ENDOTRACHEAL; INTRACARDIAC; INTRAVENOUS at 02:52

## 2018-01-01 RX ADMIN — Medication 10 ML: at 23:25

## 2018-01-01 RX ADMIN — SODIUM CHLORIDE 100 ML/HR: 900 INJECTION, SOLUTION INTRAVENOUS at 00:24

## 2018-01-01 RX ADMIN — PIPERACILLIN SODIUM,TAZOBACTAM SODIUM 3.38 G: 3; .375 INJECTION, POWDER, FOR SOLUTION INTRAVENOUS at 01:48

## 2018-01-01 RX ADMIN — PIPERACILLIN SODIUM,TAZOBACTAM SODIUM 3.38 G: 3; .375 INJECTION, POWDER, FOR SOLUTION INTRAVENOUS at 10:29

## 2018-01-01 RX ADMIN — AMIODARONE HYDROCHLORIDE 0.5 MG/MIN: 50 INJECTION, SOLUTION INTRAVENOUS at 12:05

## 2018-01-01 RX ADMIN — ALBUTEROL SULFATE 2.5 MG: 2.5 SOLUTION RESPIRATORY (INHALATION) at 20:21

## 2018-01-01 RX ADMIN — AZITHROMYCIN MONOHYDRATE 500 MG: 500 INJECTION, POWDER, LYOPHILIZED, FOR SOLUTION INTRAVENOUS at 21:06

## 2018-01-01 RX ADMIN — FOLIC ACID: 5 INJECTION, SOLUTION INTRAMUSCULAR; INTRAVENOUS; SUBCUTANEOUS at 13:21

## 2018-01-01 RX ADMIN — METHYLPREDNISOLONE SODIUM SUCCINATE 40 MG: 40 INJECTION, POWDER, FOR SOLUTION INTRAMUSCULAR; INTRAVENOUS at 09:50

## 2018-01-01 RX ADMIN — METHYLPREDNISOLONE SODIUM SUCCINATE 40 MG: 40 INJECTION, POWDER, FOR SOLUTION INTRAMUSCULAR; INTRAVENOUS at 08:59

## 2018-01-01 RX ADMIN — PIPERACILLIN SODIUM,TAZOBACTAM SODIUM 3.38 G: 3; .375 INJECTION, POWDER, FOR SOLUTION INTRAVENOUS at 10:04

## 2018-01-01 RX ADMIN — CALCIUM CHLORIDE 1 G: 100 INJECTION, SOLUTION INTRAVENOUS; INTRAVENTRICULAR at 02:51

## 2018-01-01 RX ADMIN — HEPARIN SODIUM 5000 UNITS: 5000 INJECTION, SOLUTION INTRAVENOUS; SUBCUTANEOUS at 18:09

## 2018-01-01 RX ADMIN — TUBERCULIN PURIFIED PROTEIN DERIVATIVE 5 UNITS: 5 INJECTION, SOLUTION INTRADERMAL at 03:00

## 2018-01-01 RX ADMIN — SODIUM BICARBONATE 50 MEQ: 84 INJECTION, SOLUTION INTRAVENOUS at 02:33

## 2018-01-01 RX ADMIN — AZITHROMYCIN MONOHYDRATE 500 MG: 500 INJECTION, POWDER, LYOPHILIZED, FOR SOLUTION INTRAVENOUS at 21:57

## 2018-01-01 RX ADMIN — PIPERACILLIN SODIUM,TAZOBACTAM SODIUM 3.38 G: 3; .375 INJECTION, POWDER, FOR SOLUTION INTRAVENOUS at 10:00

## 2018-01-01 RX ADMIN — HEPARIN SODIUM 5000 UNITS: 5000 INJECTION, SOLUTION INTRAVENOUS; SUBCUTANEOUS at 00:28

## 2018-01-01 RX ADMIN — HEPARIN SODIUM 5000 UNITS: 5000 INJECTION, SOLUTION INTRAVENOUS; SUBCUTANEOUS at 00:38

## 2018-01-01 RX ADMIN — Medication 10 ML: at 21:23

## 2018-01-01 RX ADMIN — PROPOFOL 25 MCG/KG/MIN: 10 INJECTION, EMULSION INTRAVENOUS at 00:11

## 2018-01-01 RX ADMIN — PIPERACILLIN SODIUM,TAZOBACTAM SODIUM 3.38 G: 3; .375 INJECTION, POWDER, FOR SOLUTION INTRAVENOUS at 02:11

## 2018-01-01 RX ADMIN — FUROSEMIDE 40 MG: 10 INJECTION, SOLUTION INTRAMUSCULAR; INTRAVENOUS at 09:51

## 2018-01-01 RX ADMIN — MORPHINE SULFATE 2 MG: 2 INJECTION, SOLUTION INTRAMUSCULAR; INTRAVENOUS at 14:43

## 2018-01-01 RX ADMIN — VANCOMYCIN HYDROCHLORIDE 1000 MG: 1 INJECTION, POWDER, LYOPHILIZED, FOR SOLUTION INTRAVENOUS at 00:52

## 2018-01-01 RX ADMIN — PERFLUTREN 1 ML: 6.52 INJECTION, SUSPENSION INTRAVENOUS at 14:00

## 2018-01-01 RX ADMIN — HEPARIN SODIUM 5000 UNITS: 5000 INJECTION, SOLUTION INTRAVENOUS; SUBCUTANEOUS at 09:57

## 2018-01-01 RX ADMIN — ASPIRIN 300 MG: 300 SUPPOSITORY RECTAL at 10:05

## 2018-01-01 RX ADMIN — SODIUM CHLORIDE 100 ML/HR: 900 INJECTION, SOLUTION INTRAVENOUS at 11:19

## 2018-01-01 RX ADMIN — SODIUM CHLORIDE 100 ML/HR: 900 INJECTION, SOLUTION INTRAVENOUS at 05:31

## 2018-01-01 RX ADMIN — IOPAMIDOL 100 ML: 755 INJECTION, SOLUTION INTRAVENOUS at 19:25

## 2018-01-01 RX ADMIN — HEPARIN SODIUM 5000 UNITS: 5000 INJECTION, SOLUTION INTRAVENOUS; SUBCUTANEOUS at 17:46

## 2018-01-01 RX ADMIN — SODIUM CHLORIDE 1000 ML: 900 INJECTION, SOLUTION INTRAVENOUS at 23:24

## 2018-01-01 RX ADMIN — ALBUTEROL SULFATE 2.5 MG: 2.5 SOLUTION RESPIRATORY (INHALATION) at 13:28

## 2018-01-01 RX ADMIN — PIPERACILLIN SODIUM,TAZOBACTAM SODIUM 3.38 G: 3; .375 INJECTION, POWDER, FOR SOLUTION INTRAVENOUS at 17:31

## 2018-01-01 RX ADMIN — PIPERACILLIN SODIUM,TAZOBACTAM SODIUM 3.38 G: 3; .375 INJECTION, POWDER, FOR SOLUTION INTRAVENOUS at 18:09

## 2018-01-01 RX ADMIN — METHYLPREDNISOLONE SODIUM SUCCINATE 40 MG: 40 INJECTION, POWDER, FOR SOLUTION INTRAMUSCULAR; INTRAVENOUS at 20:52

## 2018-01-01 RX ADMIN — SODIUM CHLORIDE 40 MG: 9 INJECTION INTRAMUSCULAR; INTRAVENOUS; SUBCUTANEOUS at 09:57

## 2018-01-01 RX ADMIN — Medication 10 ML: at 05:26

## 2018-01-01 RX ADMIN — Medication 10 ML: at 11:45

## 2018-01-01 RX ADMIN — Medication 10 ML: at 16:25

## 2018-01-01 RX ADMIN — PIPERACILLIN SODIUM,TAZOBACTAM SODIUM 3.38 G: 3; .375 INJECTION, POWDER, FOR SOLUTION INTRAVENOUS at 18:19

## 2018-01-01 RX ADMIN — Medication 10 ML: at 14:23

## 2018-01-01 RX ADMIN — EPINEPHRINE 1 MG: 0.1 INJECTION, SOLUTION ENDOTRACHEAL; INTRACARDIAC; INTRAVENOUS at 02:34

## 2018-01-01 RX ADMIN — HEPARIN SODIUM 5000 UNITS: 5000 INJECTION, SOLUTION INTRAVENOUS; SUBCUTANEOUS at 18:03

## 2018-01-01 RX ADMIN — TIMOLOL MALEATE 1 DROP: 5 SOLUTION OPHTHALMIC at 09:20

## 2018-01-01 RX ADMIN — SODIUM CHLORIDE 100 ML/HR: 900 INJECTION, SOLUTION INTRAVENOUS at 19:47

## 2018-01-01 RX ADMIN — SODIUM CHLORIDE 40 MG: 9 INJECTION INTRAMUSCULAR; INTRAVENOUS; SUBCUTANEOUS at 08:59

## 2018-01-01 RX ADMIN — EPINEPHRINE 1 MG: 0.1 INJECTION, SOLUTION ENDOTRACHEAL; INTRACARDIAC; INTRAVENOUS at 02:23

## 2018-01-01 RX ADMIN — NOREPINEPHRINE BITARTRATE 16 MCG/MIN: 4 SOLUTION at 07:19

## 2018-01-01 RX ADMIN — SODIUM CHLORIDE 40 MG: 9 INJECTION INTRAMUSCULAR; INTRAVENOUS; SUBCUTANEOUS at 10:05

## 2018-01-01 RX ADMIN — METHYLPREDNISOLONE SODIUM SUCCINATE 40 MG: 40 INJECTION, POWDER, FOR SOLUTION INTRAMUSCULAR; INTRAVENOUS at 21:31

## 2018-01-01 RX ADMIN — HEPARIN SODIUM 5000 UNITS: 5000 INJECTION, SOLUTION INTRAVENOUS; SUBCUTANEOUS at 08:11

## 2018-01-01 RX ADMIN — HEPARIN SODIUM 5000 UNITS: 5000 INJECTION, SOLUTION INTRAVENOUS; SUBCUTANEOUS at 10:33

## 2018-01-01 RX ADMIN — SODIUM CHLORIDE 100 ML/HR: 900 INJECTION, SOLUTION INTRAVENOUS at 07:54

## 2018-01-01 RX ADMIN — TIMOLOL MALEATE 1 DROP: 5 SOLUTION OPHTHALMIC at 19:57

## 2018-01-01 RX ADMIN — LORAZEPAM 2 MG: 2 INJECTION INTRAMUSCULAR at 16:18

## 2018-01-01 RX ADMIN — HEPARIN SODIUM 5000 UNITS: 5000 INJECTION, SOLUTION INTRAVENOUS; SUBCUTANEOUS at 09:55

## 2018-01-01 RX ADMIN — METOPROLOL TARTRATE 12.5 MG: 25 TABLET ORAL at 12:37

## 2018-01-01 RX ADMIN — SODIUM CHLORIDE 500 ML: 900 INJECTION, SOLUTION INTRAVENOUS at 16:51

## 2018-01-01 RX ADMIN — Medication 10 MCG/MIN: at 23:59

## 2018-01-01 RX ADMIN — SODIUM CHLORIDE 150 ML/HR: 900 INJECTION, SOLUTION INTRAVENOUS at 14:30

## 2018-01-01 RX ADMIN — Medication 10 ML: at 05:47

## 2018-01-01 RX ADMIN — HEPARIN SODIUM 5000 UNITS: 5000 INJECTION, SOLUTION INTRAVENOUS; SUBCUTANEOUS at 00:31

## 2018-01-01 RX ADMIN — PIPERACILLIN SODIUM,TAZOBACTAM SODIUM 3.38 G: 3; .375 INJECTION, POWDER, FOR SOLUTION INTRAVENOUS at 01:27

## 2018-01-01 RX ADMIN — EPINEPHRINE 1 MG: 0.1 INJECTION, SOLUTION ENDOTRACHEAL; INTRACARDIAC; INTRAVENOUS at 02:56

## 2018-01-01 RX ADMIN — METHYLPREDNISOLONE SODIUM SUCCINATE 40 MG: 40 INJECTION, POWDER, FOR SOLUTION INTRAMUSCULAR; INTRAVENOUS at 08:11

## 2018-01-01 RX ADMIN — Medication 10 ML: at 05:11

## 2018-01-01 RX ADMIN — FUROSEMIDE 80 MG: 10 INJECTION, SOLUTION INTRAMUSCULAR; INTRAVENOUS at 14:44

## 2018-01-01 RX ADMIN — EPINEPHRINE 10 MCG/MIN: 1 INJECTION PARENTERAL at 03:30

## 2018-01-01 RX ADMIN — ASPIRIN 325 MG ORAL TABLET 325 MG: 325 PILL ORAL at 10:16

## 2018-01-01 RX ADMIN — SODIUM CHLORIDE 500 ML: 900 INJECTION, SOLUTION INTRAVENOUS at 21:58

## 2018-01-01 RX ADMIN — HEPARIN SODIUM 5000 UNITS: 5000 INJECTION, SOLUTION INTRAVENOUS; SUBCUTANEOUS at 01:40

## 2018-01-01 RX ADMIN — Medication 10 ML: at 16:19

## 2018-01-01 RX ADMIN — Medication 10 ML: at 14:43

## 2018-01-01 RX ADMIN — HEPARIN SODIUM 5000 UNITS: 5000 INJECTION, SOLUTION INTRAVENOUS; SUBCUTANEOUS at 18:19

## 2018-01-01 RX ADMIN — TIMOLOL MALEATE 1 DROP: 5 SOLUTION OPHTHALMIC at 09:03

## 2018-01-01 RX ADMIN — PIPERACILLIN SODIUM,TAZOBACTAM SODIUM 3.38 G: 3; .375 INJECTION, POWDER, FOR SOLUTION INTRAVENOUS at 02:18

## 2018-01-01 RX ADMIN — SODIUM CHLORIDE 1000 ML: 900 INJECTION, SOLUTION INTRAVENOUS at 18:16

## 2018-01-01 RX ADMIN — NOREPINEPHRINE BITARTRATE 4 MCG/MIN: 4 SOLUTION at 02:09

## 2018-01-01 RX ADMIN — PIPERACILLIN SODIUM,TAZOBACTAM SODIUM 3.38 G: 3; .375 INJECTION, POWDER, FOR SOLUTION INTRAVENOUS at 01:25

## 2018-01-01 RX ADMIN — HEPARIN SODIUM 5000 UNITS: 5000 INJECTION, SOLUTION INTRAVENOUS; SUBCUTANEOUS at 16:00

## 2018-01-01 RX ADMIN — PIPERACILLIN SODIUM,TAZOBACTAM SODIUM 3.38 G: 3; .375 INJECTION, POWDER, FOR SOLUTION INTRAVENOUS at 18:03

## 2018-01-01 RX ADMIN — EPINEPHRINE 10 MCG/MIN: 1 INJECTION PARENTERAL at 10:41

## 2018-01-01 RX ADMIN — LIDOCAINE HYDROCHLORIDE 64.9 MG: 20 INJECTION, SOLUTION INTRAVENOUS at 02:45

## 2018-01-01 RX ADMIN — NOREPINEPHRINE BITARTRATE 14 MCG/MIN: 4 SOLUTION at 03:52

## 2018-01-01 RX ADMIN — AMIODARONE HYDROCHLORIDE 0.5 MG/MIN: 50 INJECTION, SOLUTION INTRAVENOUS at 03:42

## 2018-01-01 RX ADMIN — Medication 10 ML: at 05:31

## 2018-01-01 RX ADMIN — HYOSCYAMINE SULFATE 0.25 MG: 0.12 TABLET ORAL; SUBLINGUAL at 18:15

## 2018-01-01 RX ADMIN — ASPIRIN 325 MG ORAL TABLET 325 MG: 325 PILL ORAL at 10:33

## 2018-01-01 RX ADMIN — MAGNESIUM SULFATE HEPTAHYDRATE 2 G: 40 INJECTION, SOLUTION INTRAVENOUS at 14:39

## 2018-04-05 PROBLEM — J18.9 PNEUMONIA: Status: ACTIVE | Noted: 2018-01-01

## 2018-04-05 NOTE — ED TRIAGE NOTES
Pt with n/v weakness x 2 weeks. Pt denies chest pain or shortness of breath.     Jeanenne Gottron, RN

## 2018-04-05 NOTE — ED PROVIDER NOTES
HPI Comments: 69-year-old gentleman presents with concerns about cough, fatigue, shortness of breath,  Vomiting, and diarrhea all of which is gotten worse over the last several days. He says the symptoms have been present or couple of weeks with some of the shortness of breath and GI symptoms present for months. He says he has not seen a doctor in 5 or 6 years. He does note that he gave up smoking several years ago as well. Patient says he doesn't have any real pain but he just feels worn out and exhausted. Elements of this note were created using speech recognition software. As such, errors of speech recognition may be present. Patient is a 69 y.o. male presenting with vomiting. The history is provided by the patient. Vomiting    Associated symptoms include diarrhea and cough. Pertinent negatives include no chills, no fever, no abdominal pain, no headaches, no arthralgias, no myalgias and no headaches. History reviewed. No pertinent past medical history. Past Surgical History:   Procedure Laterality Date    HX CATARACT REMOVAL      HX TONSILLECTOMY      HX VASECTOMY           History reviewed. No pertinent family history. Social History     Social History    Marital status: SINGLE     Spouse name: N/A    Number of children: N/A    Years of education: N/A     Occupational History    Not on file. Social History Main Topics    Smoking status: Former Smoker    Smokeless tobacco: Never Used    Alcohol use No    Drug use: No    Sexual activity: Not on file     Other Topics Concern    Not on file     Social History Narrative    No narrative on file         ALLERGIES: Antihistamines - alkylamine; Imodium [loperamide]; and Tetracycline    Review of Systems   Constitutional: Positive for activity change, appetite change, fatigue and unexpected weight change. Negative for chills, diaphoresis and fever. HENT: Negative for congestion, rhinorrhea and sore throat.     Eyes: Negative for redness and visual disturbance. Respiratory: Positive for cough and shortness of breath. Negative for chest tightness and wheezing. Cardiovascular: Negative for chest pain and palpitations. Gastrointestinal: Positive for diarrhea, nausea and vomiting. Negative for abdominal pain and blood in stool. Endocrine: Negative for polydipsia and polyuria. Genitourinary: Negative for dysuria and hematuria. Musculoskeletal: Negative for arthralgias, myalgias and neck stiffness. Skin: Negative for rash. Allergic/Immunologic: Negative for environmental allergies and food allergies. Neurological: Negative for dizziness, weakness and headaches. Hematological: Negative for adenopathy. Does not bruise/bleed easily. Psychiatric/Behavioral: Negative for confusion and sleep disturbance. The patient is not nervous/anxious. Vitals:    04/05/18 1731 04/05/18 1831 04/05/18 1851   BP: 104/57 107/53 96/60   Resp: 16     Temp: 97.8 °F (36.6 °C)     Weight: 65.8 kg (145 lb)              Physical Exam   Constitutional: He is oriented to person, place, and time. He appears well-developed and well-nourished. Frail elderly gentleman who looks much older than stated age   HENT:   Head: Normocephalic and atraumatic. Eyes: Conjunctivae and EOM are normal. Pupils are equal, round, and reactive to light. Neck: Normal range of motion. No tracheal deviation present. No thyromegaly present. Cardiovascular: Normal rate and regular rhythm. Pulmonary/Chest: Effort normal. No respiratory distress. He has wheezes. He has rales. He exhibits no tenderness. Scattered wheezes bilaterally. Diminished breath sounds with rales in both lower lungs   Abdominal: Soft. Bowel sounds are normal. There is no tenderness. There is no rebound and no guarding. Musculoskeletal: Normal range of motion. He exhibits no edema or tenderness. Lymphadenopathy:     He has no cervical adenopathy.    Neurological: He is alert and oriented to person, place, and time. Skin: Skin is warm and dry. Psychiatric: He has a normal mood and affect. Nursing note and vitals reviewed. MDM  Number of Diagnoses or Management Options  Diagnosis management comments: Patient's x-ray is positive for  Bilateral pleural effusions and possible infiltrates. I will get a CT scan to further clarify what these appear to be associated with any sort of pneumonia or lung masses. 9:01 PM  CT scan reveals a pulmonary nodule as well as some abnormal lymph nodes. Given these findings and the patient's symptoms I will plan to admit him for further care. I discussed the case with the hospitalist who kindly agreed to see him.         ED Course       Procedures

## 2018-04-06 PROBLEM — J90 PLEURAL EFFUSION: Status: ACTIVE | Noted: 2018-01-01

## 2018-04-06 PROBLEM — G93.40 ENCEPHALOPATHY: Status: ACTIVE | Noted: 2018-01-01

## 2018-04-06 PROBLEM — I46.9 CARDIAC ARREST (HCC): Status: ACTIVE | Noted: 2018-01-01

## 2018-04-06 PROBLEM — J96.01 ACUTE RESPIRATORY FAILURE WITH HYPOXIA (HCC): Status: ACTIVE | Noted: 2018-01-01

## 2018-04-06 NOTE — PROGRESS NOTES
TRANSFER - IN REPORT:    Verbal report received from Ravinder RN (name) on Dennis Mann  being received from ER(unit) for routine progression of care      Report consisted of patients Situation, Background, Assessment and   Recommendations(SBAR). Information from the following report(s) SBAR was reviewed with the receiving nurse. Opportunity for questions and clarification was provided. Assessment will be completed upon patients arrival to unit and care will be assumed.

## 2018-04-06 NOTE — PROGRESS NOTES
Spoke with patients son, Dr. Patricia Vidal, contact # [phone]. He currently is in [place], but states that he will come to [place] as soon as he can.

## 2018-04-06 NOTE — PROGRESS NOTES
Hospitalist updated on pt condition including LA 4.7, troponin 1.68, UOP <50 cc since ICU admission, levo gtt at 20mcg. No orders received.

## 2018-04-06 NOTE — PROGRESS NOTES
MD at bedside at 4425 with follow up labs ordered from elevated troponin and lactic acid that were reported to him at 0130. 500cc bolus ordered at this time for minimal UOP.

## 2018-04-06 NOTE — PROGRESS NOTES
Shift report received from [facility]. Pt unresponsive, does not withdraw to pain. Spontaneous movement of LLE only. Pupils sluggish, round, 4 mm. Pupils dilated left and upwards. No sedation running. NSR on the monitor. BP WDL off of Levophed. Afebrile. Lung sounds diminished bilaterally. PRVC 45%. Queen with minimal urine output. MD aware. Extremities mottled, pale, cool, dry, flaky. Son, Dr. Hoda Uribe, to come see patient today from [facility] per report. Everardo Reddy MD at bedside and notified of all critical lab results.

## 2018-04-06 NOTE — ED NOTES
Pt report called to [address] [facility] [phone], verbalized complete understanding of pt report and current condition, denies questions at this time. Pt VSS NAD IV intact no RED. Pt remains a/o x 4 skin warm dry intact. Pt denies needs at this time. Pt resting on stretcher awaiting transport to clean ready room.

## 2018-04-06 NOTE — PROGRESS NOTES
Patient arrived to floor at aprox 22:30. Patient was transferred from stretcher to bed by technician Ernesto Odom and ER staff member. At aprox 22:35 I walked into patient's room and introduced myself as his nurse. The patient looked at me but did not give a verbal reply. At aprox 22:55 I walked into patient's room with his medications and noted the tech Gavin Jacquelin attempting to get vitals. The vitals machine was not recording a BP. Ernesto Odom reported that pulse had read as 58. O2 Showed 99% on the monitor. Ernesto Odom attempted to get BP on other arm. This also failed. I adjusted the cuff and another attempt was made. It was at this time that I attempted to count respirations. I did not note respirations to be present. Ernesto Odom left the room at this time to obtain a manual cuff. I checked for a carotid pulse and did not feel one. I check for a radial pulse and still failed to find one. I attempted again to feel for a carotid pulse. When none was found I hit the call light and attempted to stimulate patient. When the charge nurse answered I informed her that I was calling a code. I hit the code button and started CPR at 23:03. Respiratory arrived at aprox 23:05 and began to set up bagging. Crash cart was brought to bedside by Nursing supervisor Mariely HAYWARD who arrived at aprox 23:06 and we began to attach the patient to the defibrillator. Nam Vargas RN attached the pads as I continued CPR. MD and code team arrived and aprox 23:07. Respiratory began bagging as Michaelanijennifer took over CPR and I updated MD on the patient and his diagnosis and condition. Respiratory therapists Cristal Ash and Jorge intubated the patient. CPR was paused as MD attempted to find femoral pulse. No pulse was found. MD resumed CPR as Mariely HAYWARD went to ICU to obtain a doppler. Code team MAINOR Berry took over CPR for MD. Leads were placed. [facility] returned with the doppler and CPR was again paused as MD attempted to find pulse. Pulse was found by doppler at 23:11.  Normal Saline bolus was hung and patient was transferred to ICU. Report was given to [facility] in ICU. Patient lives alone and there is no recorded contact information for family. Nursing supervisor attempting to find family to contact.

## 2018-04-06 NOTE — PROGRESS NOTES
Care Management Interventions  Transition of Care Consult (CM Consult): SNF  Partner SNF: Yes  Current Support Network: Own Home  Plan discussed with Pt/Family/Caregiver: Yes  Freedom of Choice Offered: Yes  Discharge Location  Discharge Placement: Skilled nursing facility    CM met with patient's girlfriend who was at the bedside Demetrius Bell [phone]). CM then met with the patient's girlfriend in the barron outside of ICU. Patient's girlfriend states that she has known the patient since 2011, and that he mostly lives at her home, but they go to his address to retrieve his mail. Patient's girlfriend states that the patient was independent in his ADLs, and only used a cane when they \"went out. \" Patient's girlfriend states that the patient drove, and that \"this is completely new. \" Patient's girlfriend also states that the patient was not on O2 at home and did not have a PCP. Patient's girlfriend left, and CM went back to the patient's room to meet with the patient's son (Leonard [phone]). Patient's son states that he has not seen his father in \"nine years\" and is a \"surgeon\" in [place]. Patient's son states that he wants to be contacted for all medical decision. CM acknowledged and shared this request with the patient's nurse Uri Hoskins. CM asked if the patient's son had a preference for facilities, and the patient's son states that he would like somewhere close to where the patient lived, and that [facility] sends a lot of patients to. CM states that there is a facility nearby called [facility] that the CM has many discharges to. Referral will be sent once a PT note is available. CM sent a note to asia Baker requesting that a PT eval be placed if the patient is no longer on the vent over the weekend.

## 2018-04-06 NOTE — PROGRESS NOTES
Verbal bedside report received from [facility]. Shift assessment completed. Patient opens eyes slightly to stimulus, withdraws to pain only in lower extremeties. Pupils appear to be conjugate, but does not focus or track with eyes. Equal, round, sluggish response to light. Clear breath sounds, diminished in bases. NSR to ST on monitor. BP sometimes marginal, but not requiring Levophed. 2+ pitting edema to BLE, mottled/purple. Pulses present via Doppler. Oliguric via Queen. Son at bedside and updated on patient condition.      Lines:  R IJ Quad CVC  #20 R FA  #20 L FA    Drips:  NS @ 100    Drains:  Queen    Airway:  ETT @ 26 cm

## 2018-04-06 NOTE — PROGRESS NOTES
Code blue was called on patient as soon as he arrived to the floor. Pt became unresponsive and pulse was unpalpable. CPR was initiated with chest compressions. Pulse was immediately recorded with doppler to the right groin. Bp was obtained and was SBP in 90s. Was intubated and placed on mechanical ventilation. Workup was sent including CBC, BMP, LA, Tropoinin, CXR, ABG. Pt was transferred to ICU. Central line and arterial line are currently being placed by Anesthesiology service. SBP dropped to 70s. Levophed with titration. Currently saturating well. Blood Cx sent in the ER. On Ceftriaxone and Azithromycin. Will add vancomycin with pharmacy to help dosing. Addendum: LA elevated, Troponin elevated. EKG with low voltage. Will check echo. Bolus another 500 cc.  Repeat labs in am.

## 2018-04-06 NOTE — PROGRESS NOTES
Ventilator check complete; patient has a #8. 0 ET tube secured at the 26 at the lip. Patient is not able to follow commands. Breath sounds are diminished. Trachea is midline, Negative for subcutaneous air, and chest excursion is symmetric. Patient is also Negative for cyanosis and is Negative for pitting edema. All alarms are set and audible. Resuscitation bag is at the head of the bed.       Ventilator Settings  Mode FIO2 Rate Tidal Volume Pressure PEEP I:E Ratio   SIMV, Pressure support, PRVC  40 %  15  550 ml  10 cm H2O  8 cm H20       Peak airway pressure: 22 cm H2O   Minute ventilation: 7.4 l/min     ABG:   Recent Labs      04/06/18   0500  04/05/18   2330   PH  7.41  7.27*   PCO2  35  41   PO2  116*  268*   HCO3  22  19*         Ledy Garber, RT

## 2018-04-06 NOTE — CONSULTS
[address] Cardiology Consult                Date of  Admission: 4/5/2018  5:31 PM     Primary Care Physician: BERNADETTE  Primary Cardiologist: BERNADETTE  Referring Physician: Dr. Bruno Newton Physician: Dr. Jose Miguel Trivedi    CC/Reason for consult: s/p cardiac arrest      Hetal Sims is a 69 y.o. male with no prior cardiac history. Patient presented to ED at [facility] with cough, SOB, fatigue, nausea, vomiting and diarrhea for the past few weeks worsening over the past few days. Labs in ED showed WBC 12.0, Cr 1.41, CT chest with no PE, +Emphysema, right greater than left infiltrates, pleural effusions. indeterminate left upper lobe nodule and right hilar lymph node, cardiomegaly, and atherosclerotic vascular disease. He was placed on abx for CAP and admitted to floor med. Shortly after arriving on med floor, the patient had a presumed cardiac arrest. No rhythm strips available and per records, appears decreased responsiveness and no pulse which triggered code. The patient received CPR, intubated and quickly achieved ROSC so no meds given. Cardiology was consulted for cardiac arrest. Trop post event is 11.49. Patient intubated at the time of examination and unable to obtain history. Opens eyes but does not respond to commands. Appears emaciated. Has received some fluid boluses for low BPs      Diagnosis    Pneumonia    Acute respiratory failure with hypoxia (HCC)    Encephalopathy    Pleural effusion    Cardiac arrest [facility])       History reviewed. No pertinent past medical history. Past Surgical History:   Procedure Laterality Date    HX CATARACT REMOVAL      HX TONSILLECTOMY      HX VASECTOMY       Allergies   Allergen Reactions    Antihistamines - Alkylamine Hives    Imodium [Loperamide] Hives    Tetracycline Hives      History reviewed. No pertinent family history.      Current Facility-Administered Medications   Medication Dose Route Frequency    tuberculin injection 5 Units  5 Units IntraDERMal ONCE    [START ON 4/7/2018] vancomycin (VANCOCIN) 1,000 mg in 0.9% sodium chloride (MBP/ADV) 250 mL  1 g IntraVENous Q24H    sodium chloride (NS) flush 5-10 mL  5-10 mL IntraVENous Q8H    sodium chloride (NS) flush 5-10 mL  5-10 mL IntraVENous PRN    0.9% sodium chloride infusion  100 mL/hr IntraVENous CONTINUOUS    acetaminophen (TYLENOL) tablet 650 mg  650 mg Oral Q4H PRN    HYDROcodone-acetaminophen (NORCO) 5-325 mg per tablet 1 Tab  1 Tab Oral Q4H PRN    ondansetron (ZOFRAN) injection 4 mg  4 mg IntraVENous Q4H PRN    heparin (porcine) injection 5,000 Units  5,000 Units SubCUTAneous Q8H    methylPREDNISolone (PF) (SOLU-MEDROL) injection 40 mg  40 mg IntraVENous Q12H    albuterol-ipratropium (DUO-NEB) 2.5 MG-0.5 MG/3 ML  3 mL Nebulization Q4H PRN    cefTRIAXone (ROCEPHIN) 2 g in 0.9% sodium chloride (MBP/ADV) 50 mL  2 g IntraVENous DAILY    azithromycin (ZITHROMAX) 500 mg in 0.9% sodium chloride (MBP/ADV) 250 mL  500 mg IntraVENous Q24H    NOREPINephrine (LEVOPHED) 4 mg in 5% dextrose 250 mL infusion  2-30 mcg/min IntraVENous TITRATE    propofol (DIPRIVAN) infusion  0-50 mcg/kg/min IntraVENous TITRATE       ROS     Unable to obtain with patient intubated.  No chest pain per records     Physical Exam  Vitals:    04/06/18 1206 04/06/18 1207 04/06/18 1208 04/06/18 1209   BP:       Pulse: 94 94 93 89   Resp: 14 14 14 14   Temp:       SpO2:       Weight:       Height:           Physical Exam:  General: intubated on vent  Neck: supple, no JVD, no carotid bruits  Heart: S1S2 with RRR without murmurs or gallops  Lungs: Clear anteriorly; dec at bases  Abd: soft, nontender, nondistended, with good bowel sounds  Ext: warm, 1-2+ edema, calves supple/nontender, pulses 2+ bilaterally  Skin: warm and dry  Neurologic: on vent    Cardiographics    Telemetry: SR/ST  Echocardiogram: ordered    Labs:   Recent Labs      04/06/18   0900  04/06/18   0309  04/06/18   0041   NA   --   142  144   K   --   4.9  4.6   MG  1.3*   -- --    BUN   --   38*  36*   CREA   --   1.41  1.32   GLU   --   165*  140*   WBC   --   17.0*  13.8*   HGB   --   12.5*  12.3*   HCT   --   38.1*  38.2*   PLT   --   262  231        Assessment/Plan:     Assessment:      Principal Problem:    Pneumonia (4/5/2018)- per primary team ; on abx    Active Problems:    Acute respiratory failure with hypoxia ([facility] [address] (4/6/2018)- per pulmonary on vent      Encephalopathy (4/6/2018)- CT head today OK, no bleed      Pleural effusion (4/6/2018)      Cardiac arrest ([facility] [address] (8/3/9770)- uncertain etiology with no strips noted; ? PEA but per records, quick ROSC . Echo with moderately impaired left ventricular function. Elevated troponin possibly demand related in the setting of code but cannot rule out underlying CAD. Based on neurological outcomes, will address further workup. Currently LV dysfunction meds limited by hypotension. Continue ASA. Further recommendations pending clinicial course. Thank you very much for this referral. We appreciate the opportunity to participate in this patient's care. We will follow along with above stated plan.     Norm Runner, MD

## 2018-04-06 NOTE — PROGRESS NOTES
Hospitalist Progress Note    2018  Admit Date: 2018  5:31 PM   NAME: Elida Kate   :  1940   MRN:  [id number]   Attending: Fili De Paz MD  PCP:  Not On File [facility]    SUBJECTIVE:     Elida Kate is a 68 yo male with unremarkable medical history admitted initially with resp distress, PNA and new left pulm nodule and hilar lymph node on CT scan. Patient has cardiopulmonary arrest immediately on medical floor and required intubation. ROSC was achieved quickly and no medications used. Anesthesiology placed central and arterial line and patient transported to ICU on vent with sedation. Pulmonary consulted for vent management. Today: patient intubated with light sedation. Opens eyes spontaneously. Does not follow commands      Review of Systems negative with exception of pertinent positives noted above    PHYSICAL EXAM       Visit Vitals    BP (!) 88/59    Pulse 88    Temp 98.3 °F (36.8 °C)    Resp 15    Ht 6' 1\" (1.854 m)    Wt 65.8 kg (145 lb)    SpO2 94%    BMI 19.13 kg/m2      Temp (24hrs), Av.7 °F (36.5 °C), Min:97 °F (36.1 °C), Max:98.3 °F (36.8 °C)    Oxygen Therapy  O2 Sat (%): 94 % (18 0921)  Pulse via Oximetry: 138 beats per minute (18 2322)  O2 Device: Endotracheal tube;Ventilator (18 0731)  FIO2 (%): 40 % (18 0736)    Intake/Output Summary (Last 24 hours) at 18 0926  Last data filed at 18 0617   Gross per 24 hour   Intake           1770.9 ml   Output               75 ml   Net           1695.9 ml          General: Intubated on vent. Frail and cachetic  Head:  Atraumatic Normocephalic. Eyes:  Pupils L>R and reactive bilat  ENT:  No discharges/lesions. Lungs:  CTA Bilaterally. CVS:  Regular rate and rhythm,  No murmur, rub, or gallop, No JVD, No lower extremity edema. Abdomen: Soft, Non distended, Positive bowel sounds. MSK:  No deformities, lesions, Spontaneously moves extremities. Neurologic:  Light sedation.  Withdraws to painful stimuli  Skin:   Dusky cyanotic toes bilat. Heme/Lymph/Immune:  No ecchymoses, overt signs of bleeding or lymphadenopathy noted. Recent Results (from the past 24 hour(s))   CBC WITH AUTOMATED DIFF    Collection Time: 04/05/18  5:35 PM   Result Value Ref Range    WBC 12.0 (H) 4.3 - 11.1 K/uL    RBC 4.56 4.23 - 5.67 M/uL    HGB 14.5 13.6 - 17.2 g/dL    HCT 44.6 41.1 - 50.3 %    MCV 97.8 79.6 - 97.8 FL    MCH 31.8 26.1 - 32.9 PG    MCHC 32.5 31.4 - 35.0 g/dL    RDW 13.7 11.9 - 14.6 %    PLATELET 620 624 - 803 K/uL    MPV 10.9 10.8 - 14.1 FL    DF AUTOMATED      NEUTROPHILS 83 (H) 43 - 78 %    LYMPHOCYTES 6 (L) 13 - 44 %    MONOCYTES 11 4.0 - 12.0 %    EOSINOPHILS 0 (L) 0.5 - 7.8 %    BASOPHILS 0 0.0 - 2.0 %    IMMATURE GRANULOCYTES 0 0.0 - 5.0 %    ABS. NEUTROPHILS 10.0 (H) 1.7 - 8.2 K/UL    ABS. LYMPHOCYTES 0.8 0.5 - 4.6 K/UL    ABS. MONOCYTES 1.3 0.1 - 1.3 K/UL    ABS. EOSINOPHILS 0.0 0.0 - 0.8 K/UL    ABS. BASOPHILS 0.0 0.0 - 0.2 K/UL    ABS. IMM. GRANS. 0.1 0.0 - 0.5 K/UL   METABOLIC PANEL, COMPREHENSIVE    Collection Time: 04/05/18  5:35 PM   Result Value Ref Range    Sodium 145 136 - 145 mmol/L    Potassium 4.4 3.5 - 5.1 mmol/L    Chloride 102 98 - 107 mmol/L    CO2 33 (H) 21 - 32 mmol/L    Anion gap 10 7 - 16 mmol/L    Glucose 104 (H) 65 - 100 mg/dL    BUN 35 (H) 8 - 23 MG/DL    Creatinine 1.05 0.8 - 1.5 MG/DL    GFR est AA >60 >60 ml/min/1.73m2    GFR est non-AA >60 >60 ml/min/1.73m2    Calcium 9.3 8.3 - 10.4 MG/DL    Bilirubin, total 1.0 0.2 - 1.1 MG/DL    ALT (SGPT) 9 (L) 12 - 65 U/L    AST (SGOT) 18 15 - 37 U/L    Alk.  phosphatase 83 50 - 136 U/L    Protein, total 6.6 6.3 - 8.2 g/dL    Albumin 3.1 (L) 3.2 - 4.6 g/dL    Globulin 3.5 2.3 - 3.5 g/dL    A-G Ratio 0.9 (L) 1.2 - 3.5     CULTURE, BLOOD    Collection Time: 04/05/18  8:13 PM   Result Value Ref Range    Special Requests: RIGHT  FOREARM        Culture result: NO GROWTH AFTER 12 HOURS     CULTURE, BLOOD    Collection Time: 04/05/18  8:14 PM   Result Value Ref Range    Special Requests: LEFT  FOREARM        Culture result: NO GROWTH AFTER 12 HOURS     BLOOD GAS, ARTERIAL    Collection Time: 04/05/18 11:30 PM   Result Value Ref Range    pH 7.27 (L) 7.35 - 7.45      PCO2 41 35 - 45 mmHg    PO2 268 (H) 80 - 105 mmHg    BICARBONATE 19 (L) 22 - 26 mmol/L    BASE DEFICIT 8.0 (H) 0 - 2 mmol/L    TOTAL HEMOGLOBIN 13.4 11.7 - 15.0 GM/DL    O2 SAT 99 (H) 92 - 98.5 %    Arterial O2 Hgb 98.3 (H) 94 - 97 %    CARBOXYHEMOGLOBIN 0.4 (L) 0.5 - 1.5 %    METHEMOGLOBIN 0.3 0.0 - 1.5 %    DEOXYHEMOGLOBIN 1 0.0 - 5.0 %    SITE RR     ALLENS TEST POSITIVE      MODE PRVC     Tidal volume 550.0      RATE 15.0      PEEP/CPAP 8.0      Respiratory comment: Dr. Robert Arizmendi at 4 5 2018 11 40 55 PM. Read back.     CBC W/O DIFF    Collection Time: 04/06/18 12:41 AM   Result Value Ref Range    WBC 13.8 (H) 4.3 - 11.1 K/uL    RBC 3.85 (L) 4.23 - 5.67 M/uL    HGB 12.3 (L) 13.6 - 17.2 g/dL    HCT 38.2 (L) 41.1 - 50.3 %    MCV 99.2 (H) 79.6 - 97.8 FL    MCH 31.9 26.1 - 32.9 PG    MCHC 32.2 31.4 - 35.0 g/dL    RDW 14.0 11.9 - 14.6 %    PLATELET 396 894 - 125 K/uL    MPV 10.7 (L) 10.8 - 86.5 FL   METABOLIC PANEL, BASIC    Collection Time: 04/06/18 12:41 AM   Result Value Ref Range    Sodium 144 136 - 145 mmol/L    Potassium 4.6 3.5 - 5.1 mmol/L    Chloride 106 98 - 107 mmol/L    CO2 26 21 - 32 mmol/L    Anion gap 12 7 - 16 mmol/L    Glucose 140 (H) 65 - 100 mg/dL    BUN 36 (H) 8 - 23 MG/DL    Creatinine 1.32 0.8 - 1.5 MG/DL    GFR est AA >60 >60 ml/min/1.73m2    GFR est non-AA 56 (L) >60 ml/min/1.73m2    Calcium 7.5 (L) 8.3 - 10.4 MG/DL   TROPONIN I    Collection Time: 04/06/18 12:41 AM   Result Value Ref Range    Troponin-I, Qt. 1.68 (HH) 0.02 - 0.05 NG/ML   LACTIC ACID    Collection Time: 04/06/18 12:41 AM   Result Value Ref Range    Lactic acid 4.7 (HH) 0.4 - 2.0 MMOL/L   URINALYSIS W/ RFLX MICROSCOPIC    Collection Time: 04/06/18  1:11 AM   Result Value Ref Range    Color HUSSAIN Appearance CLEAR      Specific gravity 1.019 1.001 - 1.023      pH (UA) 7.0 5.0 - 9.0      Protein NEGATIVE  NEG mg/dL    Glucose NEGATIVE  mg/dL    Ketone TRACE (A) NEG mg/dL    Bilirubin SMALL (A) NEG      Blood NEGATIVE  NEG      Urobilinogen 1.0 0.2 - 1.0 EU/dL    Nitrites NEGATIVE  NEG      Leukocyte Esterase TRACE (A) NEG      WBC 3-5 0 /hpf    RBC 0-3 0 /hpf    Bacteria 0 0 /hpf   CBC W/O DIFF    Collection Time: 04/06/18  3:09 AM   Result Value Ref Range    WBC 17.0 (H) 4.3 - 11.1 K/uL    RBC 3.87 (L) 4.23 - 5.67 M/uL    HGB 12.5 (L) 13.6 - 17.2 g/dL    HCT 38.1 (L) 41.1 - 50.3 %    MCV 98.4 (H) 79.6 - 97.8 FL    MCH 32.3 26.1 - 32.9 PG    MCHC 32.8 31.4 - 35.0 g/dL    RDW 14.0 11.9 - 14.6 %    PLATELET 704 939 - 841 K/uL    MPV 10.9 10.8 - 81.4 FL   METABOLIC PANEL, BASIC    Collection Time: 04/06/18  3:09 AM   Result Value Ref Range    Sodium 142 136 - 145 mmol/L    Potassium 4.9 3.5 - 5.1 mmol/L    Chloride 103 98 - 107 mmol/L    CO2 26 21 - 32 mmol/L    Anion gap 13 7 - 16 mmol/L    Glucose 165 (H) 65 - 100 mg/dL    BUN 38 (H) 8 - 23 MG/DL    Creatinine 1.41 0.8 - 1.5 MG/DL    GFR est AA >60 >60 ml/min/1.73m2    GFR est non-AA 52 (L) >60 ml/min/1.73m2    Calcium 7.2 (L) 8.3 - 10.4 MG/DL   BLOOD GAS, ARTERIAL    Collection Time: 04/06/18  5:00 AM   Result Value Ref Range    pH 7.41 7.35 - 7.45      PCO2 35 35 - 45 mmHg    PO2 116 (H) 80 - 105 mmHg    BICARBONATE 22 22 - 26 mmol/L    BASE DEFICIT 1.8 0 - 2 mmol/L    SITE LR     ALLENS TEST POSITIVE      MODE SIMV PRVC     FIO2 45.0 %    Tidal volume 550.0      RATE 15.0      PEEP/CPAP 8.0      Respiratory comment: MAINOR Perez at 4 6 2018 5 20 34 AM. Read back.     TROPONIN I    Collection Time: 04/06/18  7:11 AM   Result Value Ref Range    Troponin-I, Qt. 11.49 (HH) 0.02 - 0.05 NG/ML   LACTIC ACID    Collection Time: 04/06/18  7:11 AM   Result Value Ref Range    Lactic acid 3.8 (HH) 0.4 - 2.0 MMOL/L         Imaging /Procedures /Studies   CT Chest IMPRESSION  Impression:   1. No evidence of PE.  2. Emphysema. Right greater than left infiltrates and pleural effusions. 3. Indeterminate left upper lobe nodule and right hilar lymph node. 4. Cardiomegaly, atherosclerotic vascular disease. ASSESSMENT      Hospital Problems as of 4/6/2018  Never Reviewed          Codes Class Noted - Resolved POA    Acute respiratory failure with hypoxia ([facility] [address] ICD-10-CM: J96.01  ICD-9-CM: 518.81  4/6/2018 - Present Unknown        Encephalopathy ICD-10-CM: G93.40  ICD-9-CM: 348.30  4/6/2018 - Present Unknown        Pleural effusion ICD-10-CM: J90  ICD-9-CM: 511.9  4/6/2018 - Present Unknown        Cardiac arrest [facility]) ICD-10-CM: I46.9  ICD-9-CM: 427.5  4/6/2018 - Present Unknown        Pneumonia ICD-10-CM: J18.9  ICD-9-CM: 867  4/5/2018 - Present Unknown            Cardioplumonary arrest:  Continue Telemetry. Troponin elevated post resuscitation. Echo pending. Cardiology consulted and input appreciated. Acute Hypoxic Resp Failure: Continue Vent and wean as tolerated. Pulm managing and input appreciated     Pneumonia:  Will continue Ceftriaxone, Azithromycin  and Vancomycin. Cultures pending    Hypotension: Titrate pressors to maintain MAP >65     Emphysema: seen on CT scan. No prior diagnosis of COPD. Chronic smoker. Cont on solumedrol, duonebs and IV abx.      General symptoms of fatigue and N/V/D: Will cont supportive management and monitor.      Cardiomegaly and atherosclerotic vascular disease: echo pending     Bilateral small pleural effusions. 7mm left upper lobe nodule and right hilar lymph node 1.6 x 1.3 cm: Pulmonary input appreciated. Will need further workup for malignancy     Cachexia and malnutrition: Continue maintenance fluid for now. Will consider NG tube feeds if unable to wean from vent     Chronic former smoker: Reports quit ~ 5 years ago.      DVT ppx:  SQH.    Code status:  Full  Risk:  high      Cale Delvalle MD

## 2018-04-06 NOTE — H&P
Hospitalist H&P Note     Admit Date:  2018  5:31 PM   Name:  Lesly Zhang   Age:  69 y.o.  :  1940   MRN:  [id number]   PCP:  Not On File [facility]  Treatment Team: Attending Provider: Arabella Pruett MD; Primary Nurse: Quang Gardner RN; Primary Nurse: Laila Mcgarry    HPI:   Mr. Jessy Luke is a 68 yo male, who denies any health problem or any medication. He has a history of smoking which he quit ~ 5 years ago. He presented to the ER with symptoms of persistent cough, SOB, fatigue, nausea, vomiting and diarrhea for the past few weeks worsening over the past few days. Pt is not able to provide with a more detailed history. He says he has not seen a doctor in 5 or 6 years. He does note that he gave up smoking several years ago as well. Patient says he doesn't have any real pain but he just feels worn out and exhausted. In the ER he was hemodynamically stable, afebrile. CBC with wbc 12. CMP with CO2 33, BUN 35 and Cr 1.05.     CT chest neg for PE, however, revealed emphysema, b/l lung infiltrates, bilateral small pleural effusions. 7mm left upper lobe nodule and right hilar lymph node 1.6 x 1.3 cm. Cardiomegaly and atherosclerotic vascular disease. Bld Cx was sent. Received Azithromycin and Ceftriaxone. Pt lives with a partner. 10 systems reviewed and negative except as noted in HPI. History reviewed. No pertinent past medical history. Past Surgical History:   Procedure Laterality Date    HX CATARACT REMOVAL      HX TONSILLECTOMY      HX VASECTOMY        Allergies   Allergen Reactions    Antihistamines - Alkylamine Hives    Imodium [Loperamide] Hives    Tetracycline Hives      Social History   Substance Use Topics    Smoking status: Former Smoker    Smokeless tobacco: Never Used    Alcohol use No      History reviewed. No pertinent family history. There is no immunization history on file for this patient.   PTA Medications:  Prior to Admission Medications Prescriptions Last Dose Informant Patient Reported? Taking? cholecalciferol (VITAMIN D3) 1,000 unit cap   Yes Yes   Sig: Take  by mouth daily. folic acid 570 mcg tablet   Yes Yes   Sig: Take 400 mcg by mouth daily. Facility-Administered Medications: None       Objective:   Patient Vitals for the past 24 hrs:   Temp Resp BP   04/05/18 1851 - - 96/60   04/05/18 1831 - - 107/53   04/05/18 1731 97.8 °F (36.6 °C) 16 104/57        No intake or output data in the 24 hours ending 04/05/18 2201    Physical Exam:  General:    Well nourished. Alert. Frail. Cachectic. Eyes:   Normal sclera. Extraocular movements intact. ENT:  Normocephalic, atraumatic. Moist mucous membranes  CV:   RRR. No murmur, rub, or gallop. Lungs:  Scattered wheezes bilaterally. Diminished breath sounds with rales in both lower lungs   Abdomen: Soft, nontender, nondistended. Bowel sounds normal.   Extremities: Warm and dry. No cyanosis or edema. Neurologic: CN II-XII grossly intact. Sensation intact. Skin:     No rashes or jaundice. Psych:  Normal mood and affect. I reviewed the labs, imaging, EKGs, telemetry, and other studies done this admission. Data Review:   Recent Results (from the past 24 hour(s))   CBC WITH AUTOMATED DIFF    Collection Time: 04/05/18  5:35 PM   Result Value Ref Range    WBC 12.0 (H) 4.3 - 11.1 K/uL    RBC 4.56 4.23 - 5.67 M/uL    HGB 14.5 13.6 - 17.2 g/dL    HCT 44.6 41.1 - 50.3 %    MCV 97.8 79.6 - 97.8 FL    MCH 31.8 26.1 - 32.9 PG    MCHC 32.5 31.4 - 35.0 g/dL    RDW 13.7 11.9 - 14.6 %    PLATELET 703 731 - 360 K/uL    MPV 10.9 10.8 - 14.1 FL    DF AUTOMATED      NEUTROPHILS 83 (H) 43 - 78 %    LYMPHOCYTES 6 (L) 13 - 44 %    MONOCYTES 11 4.0 - 12.0 %    EOSINOPHILS 0 (L) 0.5 - 7.8 %    BASOPHILS 0 0.0 - 2.0 %    IMMATURE GRANULOCYTES 0 0.0 - 5.0 %    ABS. NEUTROPHILS 10.0 (H) 1.7 - 8.2 K/UL    ABS. LYMPHOCYTES 0.8 0.5 - 4.6 K/UL    ABS. MONOCYTES 1.3 0.1 - 1.3 K/UL    ABS.  EOSINOPHILS 0.0 0.0 - 0.8 K/UL    ABS. BASOPHILS 0.0 0.0 - 0.2 K/UL    ABS. IMM. GRANS. 0.1 0.0 - 0.5 K/UL   METABOLIC PANEL, COMPREHENSIVE    Collection Time: 04/05/18  5:35 PM   Result Value Ref Range    Sodium 145 136 - 145 mmol/L    Potassium 4.4 3.5 - 5.1 mmol/L    Chloride 102 98 - 107 mmol/L    CO2 33 (H) 21 - 32 mmol/L    Anion gap 10 7 - 16 mmol/L    Glucose 104 (H) 65 - 100 mg/dL    BUN 35 (H) 8 - 23 MG/DL    Creatinine 1.05 0.8 - 1.5 MG/DL    GFR est AA >60 >60 ml/min/1.73m2    GFR est non-AA >60 >60 ml/min/1.73m2    Calcium 9.3 8.3 - 10.4 MG/DL    Bilirubin, total 1.0 0.2 - 1.1 MG/DL    ALT (SGPT) 9 (L) 12 - 65 U/L    AST (SGOT) 18 15 - 37 U/L    Alk. phosphatase 83 50 - 136 U/L    Protein, total 6.6 6.3 - 8.2 g/dL    Albumin 3.1 (L) 3.2 - 4.6 g/dL    Globulin 3.5 2.3 - 3.5 g/dL    A-G Ratio 0.9 (L) 1.2 - 3.5         All Micro Results     Procedure Component Value Units Date/Time    CULTURE, BLOOD [[id number]] Collected:  04/05/18 2014    Order Status:  Completed Specimen:  Whole Blood from Blood Updated:  04/05/18 2031    CULTURE, BLOOD [[id number]] Collected:  04/05/18 2013    Order Status:  Completed Specimen:  Whole Blood from Blood Updated:  04/05/18 2031          Other Studies:  Xr Chest Pa Lat    Result Date: 4/5/2018  Chest 2 view CLINICAL INDICATION: Chronic cough and dyspnea with recent worsening for last one week COMPARISON: None TECHNIQUE: Upright AP and lateral views of the chest FINDINGS: Lung volumes are well inflated. Cardiac silhouette is enlarged. There is suggestion of a small hiatal hernia. Scattered aortic calcification and tortuosity noted. Hilar contours are normal. No evidence of pneumothorax, pulmonary edema, or dense consolidation. Small right and trace left pleural effusions are noted at the bases. There are mild groundglass and linear opacities in both posterior lower lobes as well. Bone density is low. No displaced fracture or dislocation is seen.      IMPRESSION: Mild bibasilar infiltrates and pleural effusions. Ct Chest W Cont    Result Date: 4/5/2018  CT Chest with contrast Clinical Indication:  2 weeks of weakness with worsening cough and dyspnea, further evaluation of abnormal chest radiograph, pleural effusions and infiltrates Comparison:  Chest x-ray earlier today Technique:  Automated exposure Control was used. Contiguous axial images were obtained from the neck base through the upper abdomen following the uneventful administration of 100 cc optiray 350 intravenous contrast. Pulmonary embolus protocol was used. Coronal reconstructions were done for further evaluation of vessels. Findings:  The pulmonary arteries are opacified demonstrating no filling defect. The heart is enlarged, most prominently the right ventricle and the right atrium. There is a small to moderate layering pleural effusion on the right and trace layering pleural fluid on the left. There are scattered calcifications of the aorta and coronary arteries. No pericardial effusion. Esophagus is mildly dilated throughout, with nonspecific wall thickening and intraluminal fluid. A 1.6 x 1.3 cm right hilar lymph node is indeterminate (coronal image 62). No definite thoracic lymphadenopathy elsewhere. Limited upper abdominal views demonstrate ascites, with no adrenal mass. Tiny nonobstructing calcifications are noted in both kidneys. Stomach is distended with fluid. There are nonspecific mildly prominent bowel loops partially seen. The patient appears generally cachectic. The bone density is diffusely low. No fractures, dislocations or aggressive osseous lesions are evident. There is mildly increased thoracic kyphosis. Lung windows demonstrate a small amount of debris within the trachea and moderate upper lobe predominant emphysematous changes. There is groundglass opacity and partial consolidation of a small portion of the posterior right lower lobe. There is partial atelectasis of the right middle lobe.  No evidence of pneumothorax or spiculated lung mass. Mild groundglass infiltrate in the posterior left lower lobe. Tiny indeterminate 7 mm nodule in lateral left upper lobe (coronal 46, axial 29-31). Impression: 1. No evidence of PE. 2. Emphysema. Right greater than left infiltrates and pleural effusions. 3. Indeterminate left upper lobe nodule and right hilar lymph node. 4. Cardiomegaly, atherosclerotic vascular disease. Assessment and Plan:     Hospital Problems as of 4/5/2018  Never Reviewed          Codes Class Noted - Resolved POA    Pneumonia ICD-10-CM: J18.9  ICD-9-CM: 920  4/5/2018 - Present Unknown            Pneumonia: With symptoms of cough, sob worsening for weeks. Seen on CT chest. Will continue Ceftriaxone and Azithromycin. Will consult pulmonary service. Emphysema: seen on CT scan. No prior diagnosis of COPD. Chronic smoker. Cont Will place on solumedrol, duonebs and IV abx. General symptoms of fatigue and N/V/D: Will cont supportive management and monitor. Cardiomegaly and atherosclerotic vascular disease: seen on CT scan. Will need to establish care with pcp for further monitoring. Currently appears to be stable. Bilateral small pleural effusions. 7mm left upper lobe nodule and right hilar lymph node 1.6 x 1.3 cm: Will consult pulmonary service for assist. Pt will likely require periodic surveillance. Cachexia and malnutrition    Chronic former smoker: Reports quit ~ 5 years ago. DVT ppx:  SQH. Code status:  Full  Estimated LOS:  Greater than 2 midnights  Risk:  high    Signed:   Sebastian Cabrales MD

## 2018-04-06 NOTE — PROGRESS NOTES
Pt to room 375 from floor intubated and placed on vent via RT. MD at bedside aware of SBP 70s. MAP 51. Bolus NS given at this time. Lab at bedside drawing stat labs. Anesthesia called for CVC and arterial line. HR 90s, O2 sat 100% on 100% Fio2. Lung sounds are diminished bowel sounds are not present. Pupils are equal and sluggish. Pt is not following any commands or withdrawing to pain. Bilateral feet are swollen, mottled, and cold. Femoral pulses are palpable.      2354 levophed gtt ordered for 59/34(48)

## 2018-04-06 NOTE — CDMP QUERY
Please clarify if this patient is being treated/managed for:    **Septic Shock in the setting of pneumonia, cachexia, malnutrition, cardiac arrest withWBC-17.0, HR-102, B/P-59/34, LA-4.7 to 3.8, PCT-5.6 being treated withIV Rocephin/Zithromax/Vancomycin, IVF Boluses > 2L, Levophed gtt. =>Other Explanation of clinical findings  =>Unable to Determine (no explanation of clinical findings)    The medical record reflects the following:    Risk Factors: pneumonia, cachexia, malnutrition, cardiac arrest    Clinical Indicators: WBC-17.0, HR-102, B/P-59/34, LA-4.7 to 3.8, PCT-5.6    Treatment: IV Rocephin/Zithromax/Vancomycin, IVF Boluses > 2L, Levophed gtt    Please clarify and document your clinical opinion in the progress notes and discharge summary including the definitive and/or presumptive diagnosis, (suspected or probable), related to the above clinical findings. Please include clinical findings supporting your diagnosis.     Thanks,  Marco Serrano RN, CDS  Compliant Documentation Management Program  [phone]

## 2018-04-06 NOTE — PROCEDURES
Central Line Placement    Start time: 4/6/2018 12:01 AM  End time: 4/6/2018 12:20 AM  Performed by: Royal Curling  Authorized by: Royal Curling     Indications: need for vasopressors and sepsis protocol  Preanesthetic Checklist: patient identified, risks and benefits discussed, anesthesia consent, site marked, patient being monitored and timeout performed    Timeout Time: 00:00       Pre-procedure: All elements of maximal sterile barrier technique followed? Yes    2% Chlorhexidine for cutaneous antisepsis, Hand hygiene performed prior to catheter insertion and Ultrasound guidance    Sterile Ultrasound Technique followed?: Yes        Ultrasound Image Stored? Image stored    Procedure:   Prep:  Chlorhexidine  Location:  Internal jugular  Orientation:  Right  Patient position:  Trendelenburg  Catheter type:  Quad lumen      Number of attempts:  1  Successful placement: Yes      Assessment:   Post-procedure:  Catheter secured and sterile dressing applied  Assessment:  Blood return through all ports, free fluid flow, placement verified by x-ray and guidewire removal verified  Insertion:  Uncomplicated  Patient tolerance:  Patient tolerated the procedure well with no immediate complications  Quad lumen cath placed via Seldinger technique utilizing real-time ultrasound guidance. Attempts made at placing radial arterial line but unsuccessful bilaterally. Radial artery very shallow and small, unable to thread wire despite ultrasound.

## 2018-04-06 NOTE — PROGRESS NOTES
Vancomycin Consult (Day 1)    MD ordering: Rebecca GOLDMAN following? no  Indication: CAP  Goal level(s): 15 - 20    Allergies as of 2018 - Review Complete 2018   Allergen Reaction Noted    Antihistamines - alkylamine Hives 2018    Imodium [loperamide] Hives 2018    Tetracycline Hives 2018     Current Antimicrobial Therapy (168h ago through future)      Ordered     Start Stop      189  azithromycin (ZITHROMAX) 500 mg in 0.9% sodium chloride (MBP/ADV) 250 mL  500 mg,   IntraVENous,   EVERY 24 HOURS      18 2200 --    18 2243  cefTRIAXone (ROCEPHIN) 2 g in 0.9% sodium chloride (MBP/ADV) 50 mL  2 g,   IntraVENous,   DAILY      18 0900 --            All Micro Results       Procedure Component Value Units Date/Time      CULTURE, RESPIRATORY/SPUTUM/BRONCH Seltzer Citron STAIN [[id number]]     Order Status:  Sent Specimen:  Sputum from Sputum     CULTURE, BLOOD [[id number]] Collected:  18    Order Status:  Completed Specimen:  Whole Blood from Blood Updated:  18    CULTURE, BLOOD [[id number]] Collected:  18    Order Status:  Completed Specimen:  Whole Blood from Blood Updated:  18            Temp (24hrs), Av.7 °F (36.5 °C), Min:97 °F (36.1 °C), Max:98.3 °F (36.8 °C)    UOP: mL/kg/hr  Dosing weight: 65 kg (65 weight)  69 y.o. Date:  Dose/Freq Admin Times Level/Time:   4-6 1500 mg LD 0233    4-7 1000 mg Q24h  (00)                  Recent Labs      18   0711  18   0309  18   0041  18   1735   BUN   --   38*  36*  35*   CREA   --   1.41  1.32  1.05   WBC   --   17.0*  13.8*  12.0*   LAC  3.8*   --   4.7*   --      Estimated Creatinine Clearance: 41.5 mL/min (based on Cr of 1.41). A/P:  Will adjust Vancomycin to 1 gram IV every 24 hours.     Benedict Jean-Baptiste PharmD, BCPS

## 2018-04-06 NOTE — PROGRESS NOTES
Dual skin assessment complete with Marjorie HAYWARD. Pt feet mottled and very dry. Sacrum is dark but blanchable. Allevyn applied. R hip with small scab noted - small allevyn applied. Pt is cachetic and sternum deformity.

## 2018-04-06 NOTE — PROGRESS NOTES
Urine output minimal throughout shift. Dr. Luisana Jernigan notified and orders received for 500 cc NS bolus.

## 2018-04-06 NOTE — PROGRESS NOTES
OT/PT note: orders received,chart reviewed. Patient had a code blue last night and is now on the Vent. No appropriate for therapy at th is time. Will discharge orders. Please re order when indicated.  Thank you for this referral. Austin Ho OTR/L

## 2018-04-06 NOTE — CONSULTS
CONSULT NOTE    Mc Spear    4/6/2018    Date of Admission:  4/5/2018    The patient's chart is reviewed and the patient is discussed with the staff. Subjective:     Patient is a 69 y.o.  male seen and evaluated at the request of Dr. Bhavana Steward  The pt. presented to the ER on 4/5 with symptoms of persistent cough, SOB, fatigue, nausea, vomiting and diarrhea for the past few weeks worsening over the past few days. In er a chest ct was done  Showing no pe, emphysema, pulmonary infiltrates, R pl effusion, 7 mm PILLO nodule, and hilar nodes. He was placed on antibx for cap and shortly after arriving at floor he had cardiac arrest. And chest compressions. He was intubated  And quickly achieved rosc so no meds given. Review of Systems  Review of systems not obtained due to patient factors. Patient Active Problem List   Diagnosis Code    Pneumonia J18.9    Acute respiratory failure with hypoxia (HCC) J96.01    Encephalopathy G93.40    Pleural effusion J90    Cardiac arrest ([facility] [address] I46.9           Prior to Admission Medications   Prescriptions Last Dose Informant Patient Reported? Taking? cholecalciferol (VITAMIN D3) 1,000 unit cap   Yes Yes   Sig: Take  by mouth daily. folic acid 632 mcg tablet   Yes Yes   Sig: Take 400 mcg by mouth daily. Facility-Administered Medications: None       History reviewed. No pertinent past medical history. Past Surgical History:   Procedure Laterality Date    HX CATARACT REMOVAL      HX TONSILLECTOMY      HX VASECTOMY       Social History     Social History    Marital status: SINGLE     Spouse name: N/A    Number of children: N/A    Years of education: N/A     Occupational History    Not on file.      Social History Main Topics    Smoking status: Former Smoker    Smokeless tobacco: Never Used    Alcohol use No    Drug use: No    Sexual activity: Not on file     Other Topics Concern    Not on file     Social History Narrative    No narrative on file     History reviewed. No pertinent family history.   Allergies   Allergen Reactions    Antihistamines - Alkylamine Hives    Imodium [Loperamide] Hives    Tetracycline Hives       Current Facility-Administered Medications   Medication Dose Route Frequency    tuberculin injection 5 Units  5 Units IntraDERMal ONCE    [START ON 4/7/2018] vancomycin (VANCOCIN) 1500 mg in  ml infusion  1,500 mg IntraVENous Q24H    sodium chloride (NS) flush 5-10 mL  5-10 mL IntraVENous Q8H    sodium chloride (NS) flush 5-10 mL  5-10 mL IntraVENous PRN    0.9% sodium chloride infusion  100 mL/hr IntraVENous CONTINUOUS    acetaminophen (TYLENOL) tablet 650 mg  650 mg Oral Q4H PRN    HYDROcodone-acetaminophen (NORCO) 5-325 mg per tablet 1 Tab  1 Tab Oral Q4H PRN    ondansetron (ZOFRAN) injection 4 mg  4 mg IntraVENous Q4H PRN    heparin (porcine) injection 5,000 Units  5,000 Units SubCUTAneous Q8H    methylPREDNISolone (PF) (SOLU-MEDROL) injection 40 mg  40 mg IntraVENous Q12H    albuterol-ipratropium (DUO-NEB) 2.5 MG-0.5 MG/3 ML  3 mL Nebulization Q4H PRN    cefTRIAXone (ROCEPHIN) 2 g in 0.9% sodium chloride (MBP/ADV) 50 mL  2 g IntraVENous DAILY    azithromycin (ZITHROMAX) 500 mg in 0.9% sodium chloride (MBP/ADV) 250 mL  500 mg IntraVENous Q24H    NOREPINephrine (LEVOPHED) 4 mg in 5% dextrose 250 mL infusion  2-30 mcg/min IntraVENous TITRATE    propofol (DIPRIVAN) infusion  0-50 mcg/kg/min IntraVENous TITRATE         Objective:     Vitals:    04/06/18 0701 04/06/18 0716 04/06/18 0731 04/06/18 0736   BP: (!) 87/59 103/61 97/57    Pulse: 97 97 96 90   Resp: 15 15 14 15   Temp:   98.3 °F (36.8 °C)    SpO2:   95% 97%   Weight:       Height:           PHYSICAL EXAM     Constitutional:  the patient is well developed and in no acute distress  EENMT:  Sclera clear, pupils L >Rl, oral mucosa moist  Respiratory: clear anteriorly  Cardiovascular:  RRR without M,G,R  Gastrointestinal: soft and non-tender; with positive bowel sounds. Musculoskeletal: warm without cyanosis. There is no lower leg edema. Skin:  no jaundice or rashes, no wounds   Neurologic: no gross neuro deficits     Psychiatric:  unresponsive    CXR:        Recent Labs      04/06/18   0309  04/06/18   0041  04/05/18   1735   WBC  17.0*  13.8*  12.0*   HGB  12.5*  12.3*  14.5   HCT  38.1*  38.2*  44.6   PLT  262  231  257     Recent Labs      04/06/18   0711  04/06/18   0309  04/06/18   0041  04/05/18   1735   NA   --   142  144  145   K   --   4.9  4.6  4.4   CL   --   103  106  102   GLU   --   165*  140*  104*   CO2   --   26  26  33*   BUN   --   38*  36*  35*   CREA   --   1.41  1.32  1.05   CA   --   7.2*  7.5*  9.3   TROIQ  11.49*   --   1.68*   --    ALB   --    --    --   3.1*   TBILI   --    --    --   1.0   ALT   --    --    --   9*   SGOT   --    --    --   18     Recent Labs      04/06/18   0500  04/05/18   2330   PH  7.41  7.27*   PCO2  35  41   PO2  116*  268*   HCO3  22  19*     Recent Labs      04/06/18   0711  04/06/18   0041   LAC  3.8*  4.7*       Assessment:  (Medical Decision Making)     Hospital Problems  Never Reviewed          Codes Class Noted POA    Acute respiratory failure with hypoxia ([facility] [address] ICD-10-CM: J96.01  ICD-9-CM: 518.81  4/6/2018 Unknown        Encephalopathy ICD-10-CM: G93.40  ICD-9-CM: 348.30  4/6/2018 Unknown    unresponsive    Pleural effusion ICD-10-CM: J90  ICD-9-CM: 511.9  4/6/2018 Unknown    On R    Cardiac arrest [facility]) ICD-10-CM: I46.9  ICD-9-CM: 427.5  4/6/2018 Unknown        Pneumonia ICD-10-CM: J18.9  ICD-9-CM: 660  4/5/2018 Unknown    cap          Plan:  (Medical Decision Making)   1   Head ct  2   Cards consult  3   Iv antibx for cap  4   Vent support  --    More than 50% of the time documented was spent in face-to-face contact with the patient and in the care of the patient on the floor/unit where the patient is located.     Thank you very much for this referral.  We appreciate the opportunity to participate in this patient's care. Will follow along with above stated plan.     Sharita Ayala MD

## 2018-04-06 NOTE — PROGRESS NOTES
Visitor, Darling Kirby, at bedside. Visitor states that she is the pt's girlfriend and that he has been living with her recently. Pt's phone number in chart ([phone]). Sophia Mckay updated on patient condition. Case management consulted and at bedside. 15:22- Pt's son,  Carlos Ramirez, and his wife and daughter at bedside. Spoke with case management. Updated on pt condition and time for questions to be answered. Dr. Oh Mckinney voices concern about patient possibly having anoxic brain injury and whether pt will be able to come off of the ventilator and have a purposeful life. States that he and his family will most likely stay in town for the weekend to see extent of pt recovery in that time.

## 2018-04-06 NOTE — PROGRESS NOTES
Patient was intubated with a number 8.0 ET Tube. Tube placement verified by auscultation and PCXR. ET Tube is secured at the 24 cm chantale at the lip and on the right side. Patient was intubated by MAYKEL Bryan, ROBSON on the firstt attempt. Breath sounds are diminished but CTAB. Patient is Negative for subcutaneous air and chest excursion is symmetrical. Trachea is midline. Patient is also positive for acrocyanosis and is Negative for pitting edema. Patient placed on ventilator on documented settings. All alarms are set and audible. Resuscitation bag and mask are at the head of the bed.       Ventilator Settings  Mode FIO2 Rate Tidal Volume Pressure PEEP I:E Ratio     PRVC  100   15   550      8   1:3     Peak airway pressure:     Minute ventilation:       ABG:   Recent Labs      04/05/18   2330   PH  7.27*   PCO2  41   PO2  268*   HCO3  19*

## 2018-04-07 PROBLEM — R78.81 BACTEREMIA: Status: ACTIVE | Noted: 2018-01-01

## 2018-04-07 NOTE — PROGRESS NOTES
Ventilator check complete; patient has a #8. 0 ET tube secured at the 24 at the lip. Patient is not sedated. Patient is not able to follow commands. Breath sounds are clear. Trachea is midline, Negative for subcutaneous air, and chest excursion is symmetric. Patient is also Negative for cyanosis and is Negative for pitting edema. All alarms are set and audible. Resuscitation bag is at the head of the bed.       Ventilator Settings  Mode FIO2 Rate Tidal Volume Pressure PEEP I:E Ratio   SIMV, Pressure support, PRVC  35 %    550 ml  10 cm H2O  8 cm H20  1:3      Peak airway pressure: 22 cm H2O   Minute ventilation: 8.2 l/min     ABG:   Recent Labs      04/07/18   0310  04/06/18   0500  04/05/18   2330   PH  7.41  7.41  7.27*   PCO2  34*  35  41   PO2  88  116*  268*   HCO3  21*  22  19*         Francisco Langford, RT

## 2018-04-07 NOTE — CONSULTS
MORENO: oliguric/anuric, most likely ATN due IV contrast/ cardiac arrest and shock. AMS: anoxic brain injury? Volume ? Recheck BNP to decide about IVF. If renal F. Is worse tomorrow and possibility of dialysis, Pt will need to be transferred to [facility]. Case will be dicussed with family.   Thanks  Dr. Angella Miller

## 2018-04-07 NOTE — PROGRESS NOTES
Hospitalist Progress Note    2018  Admit Date: 2018  5:31 PM   NAME: Nerissa Love   :  1940   MRN:  [id number]   Attending: Jerome Quintero MD  PCP:  Not On File [facility]    SUBJECTIVE:     Nerissa Love is a 68 yo male with unremarkable medical history admitted initially with resp distress, PNA and new left pulm nodule and hilar lymph node on CT scan. Patient has cardiopulmonary arrest immediately on medical floor and required intubation. ROSC was achieved quickly and no medications used. Anesthesiology placed central and arterial line and patient transported to ICU on vent with sedation. Pulmonary consulted for vent management. Today: Intubated off sedation and pressors. He opens eyes spontaneously but does not follow commands. Both sons are at bedside and have been updated on current concerns and plans. They state their father would not want to remain on life prolonged life support but are trying to find a living will that clearly states the patient's wishes. PHYSICAL EXAM       Visit Vitals    /67    Pulse 92    Temp 98.9 °F (37.2 °C)    Resp 15    Ht 6' 1\" (1.854 m)    Wt 60.7 kg (133 lb 12.8 oz)    SpO2 95%    BMI 17.65 kg/m2      Temp (24hrs), Av.1 °F (37.3 °C), Min:98 °F (36.7 °C), Max:99.9 °F (37.7 °C)    Oxygen Therapy  O2 Sat (%): 95 % (18 0741)  Pulse via Oximetry: 138 beats per minute (18 2322)  O2 Device: Endotracheal tube;Ventilator (18 1915)  FIO2 (%): 35 % (18 0741)    Intake/Output Summary (Last 24 hours) at 18 1203  Last data filed at 18 0531   Gross per 24 hour   Intake          3043.34 ml   Output               60 ml   Net          2983.34 ml          General: Intubated on vent. Frail and cachetic  Head:  Atraumatic Normocephalic. Eyes:  Pupils L>R and reactive bilat  ENT:  No discharges/lesions. Lungs:  CTA Bilaterally.    CVS:  Regular rate and rhythm,  No murmur, rub, or gallop, No JVD, No lower extremity edema. Abdomen: Soft, Non distended, Positive bowel sounds. MSK:  No deformities, lesions, Spontaneously moves extremities. Neurologic:  Off sedation. Not following commands. Opens eyes spontaneously. Skin:   Dusky toes bilat. Recent Results (from the past 24 hour(s))   LACTIC ACID    Collection Time: 04/06/18  2:24 PM   Result Value Ref Range    Lactic acid 1.6 0.4 - 2.0 MMOL/L   MAGNESIUM    Collection Time: 04/06/18  2:24 PM   Result Value Ref Range    Magnesium 1.7 (L) 1.8 - 2.4 mg/dL   CULTURE, RESPIRATORY/SPUTUM/BRONCH W GRAM STAIN    Collection Time: 04/06/18  3:56 PM   Result Value Ref Range    Special Requests: NO SPECIAL REQUESTS      GRAM STAIN PENDING     Culture result:        NO GROWTH AFTER SHORT PERIOD OF INCUBATION. FURTHER RESULTS TO FOLLOW AFTER OVERNIGHT INCUBATION. BLOOD GAS, ARTERIAL    Collection Time: 04/07/18  3:10 AM   Result Value Ref Range    pH 7.41 7.35 - 7.45      PCO2 34 (L) 35 - 45 mmHg    PO2 88 80 - 105 mmHg    BICARBONATE 21 (L) 22 - 26 mmol/L    BASE DEFICIT 2.4 (H) 0 - 2 mmol/L    SITE RR     ALLENS TEST POSITIVE      MODE PRVC     Tidal volume 550.0      RATE 15.0      PEEP/CPAP 8.0      Respiratory comment: Pippa RN at 4 7 2018 3 15 17 AM. Read back. CBC WITH AUTOMATED DIFF    Collection Time: 04/07/18  3:36 AM   Result Value Ref Range    WBC 13.8 (H) 4.3 - 11.1 K/uL    RBC 3.60 (L) 4.23 - 5.67 M/uL    HGB 11.4 (L) 13.6 - 17.2 g/dL    HCT 34.5 (L) 41.1 - 50.3 %    MCV 95.8 79.6 - 97.8 FL    MCH 31.7 26.1 - 32.9 PG    MCHC 33.0 31.4 - 35.0 g/dL    RDW 14.1 11.9 - 14.6 %    PLATELET 811 290 - 982 K/uL    MPV 10.8 10.8 - 14.1 FL    DF AUTOMATED      NEUTROPHILS 92 (H) 43 - 78 %    LYMPHOCYTES 2 (L) 13 - 44 %    MONOCYTES 6 4.0 - 12.0 %    EOSINOPHILS 0 (L) 0.5 - 7.8 %    BASOPHILS 0 0.0 - 2.0 %    IMMATURE GRANULOCYTES 0 0.0 - 5.0 %    ABS. NEUTROPHILS 12.7 (H) 1.7 - 8.2 K/UL    ABS. LYMPHOCYTES 0.3 (L) 0.5 - 4.6 K/UL    ABS. MONOCYTES 0.8 0.1 - 1.3 K/UL    ABS. EOSINOPHILS 0.0 0.0 - 0.8 K/UL    ABS. BASOPHILS 0.0 0.0 - 0.2 K/UL    ABS. IMM. GRANS. 0.1 0.0 - 0.5 K/UL   METABOLIC PANEL, BASIC    Collection Time: 04/07/18  3:36 AM   Result Value Ref Range    Sodium 144 136 - 145 mmol/L    Potassium 4.1 3.5 - 5.1 mmol/L    Chloride 106 98 - 107 mmol/L    CO2 25 21 - 32 mmol/L    Anion gap 13 7 - 16 mmol/L    Glucose 139 (H) 65 - 100 mg/dL    BUN 46 (H) 8 - 23 MG/DL    Creatinine 1.90 (H) 0.8 - 1.5 MG/DL    GFR est AA 45 (L) >60 ml/min/1.73m2    GFR est non-AA 37 (L) >60 ml/min/1.73m2    Calcium 7.4 (L) 8.3 - 10.4 MG/DL   MAGNESIUM    Collection Time: 04/07/18  3:36 AM   Result Value Ref Range    Magnesium 1.9 1.8 - 2.4 mg/dL   PLEASE READ & DOCUMENT PPD TEST IN 24 HRS    Collection Time: 04/07/18  3:50 AM   Result Value Ref Range    PPD Negative Negative    mm Induration 0 mm         Imaging /Procedures /Studies   CT Chest IMPRESSION  Impression:   1. No evidence of PE.  2. Emphysema. Right greater than left infiltrates and pleural effusions. 3. Indeterminate left upper lobe nodule and right hilar lymph node. 4. Cardiomegaly, atherosclerotic vascular disease. Echocardiogram SUMMARY:  -  Left ventricle: Systolic function was moderately reduced. Ejection   Fraction was estimated in the range of 35 % to 45 %. -  Right ventricle: The ventricle was markedly dilated. Systolic function was  markedly reduced. -  Aortic valve: Aortic valve is thickened and calcified with possible mild  stenosis (difficult visualization) but does not appear to be severe. There   Was mild regurgitation.      ASSESSMENT      Hospital Problems as of 4/7/2018  Never Reviewed          Codes Class Noted - Resolved POA    Bacteremia ICD-10-CM: R78.81  ICD-9-CM: 790.7  4/7/2018 - Present Unknown        Acute respiratory failure with hypoxia ([facility] [address] ICD-10-CM: J96.01  ICD-9-CM: 518.81  4/6/2018 - Present Unknown        Encephalopathy ICD-10-CM: G93.40  ICD-9-CM: 348.30  4/6/2018 - Present Unknown Pleural effusion ICD-10-CM: J90  ICD-9-CM: 511.9  4/6/2018 - Present Unknown        Cardiac arrest [facility]) ICD-10-CM: I46.9  ICD-9-CM: 427.5  4/6/2018 - Present Unknown        * (Principal)Pneumonia of right lower lobe due to infectious organism [facility]) ICD-10-CM: J18.1  ICD-9-CM: 486  4/5/2018 - Present Unknown            Cardioplumonary arrest:  Continue Telemetry. Troponin elevated post resuscitation. Echo shows reduced EF of 35-45%. Cardiology consulted and holding further workup until more stable    Acute Hypoxic Resp Failure: Continue Vent and wean as tolerated. Pulm managing and input appreciated     Pneumonia:  Switch to Zosyn and continue Vancomycin. Final cultures pending    Acute kidney injury/Oliguria: minimal urine output since admission despite IVF. Nephrology consulted     Systolic Congestive Heart Failure:EF of 35-45% . Will monitor fluid and pulm status closely     Hypotension: Pressors currently off. Titrate pressors to maintain MAP >65     Emphysema: seen on CT scan. No prior diagnosis of COPD. Chronic smoker. Cont on solumedrol, duonebs and IV abx.      General symptoms of fatigue and N/V/D: Will cont supportive management and monitor.      Bilateral small pleural effusions. 7mm left upper lobe nodule and right hilar lymph node 1.6 x 1.3 cm: Pulmonary input appreciated. Will need further workup for malignancy     Cachexia and malnutrition: Place NGT and start tube feeds.     Chronic former smoker: Reports quit ~ 5 years ago.      DVT ppx:  SQH.    Code status:  Full  Risk:  high      Brett Tesfaye MD

## 2018-04-07 NOTE — CONSULTS
[address]  MR#: [id number]  : 1940  ACCOUNT #: [de-identified]   DATE OF SERVICE: 2018    REASON FOR CONSULTATION:  Acute kidney injury. HISTORY OF PRESENT ILLNESS:  This patient is a 73-year-old male with no major medical history on the record. Was admitted on the  because of a 2-week history of cough, shortness of breath, fatigue, nausea, vomiting, diarrhea and fever. The patient had the CT scan of the chest with IV contrast and showed no evidence of PE, but there is a presence of pleural effusion and evidence of emphysema and a right partial atelectasis versus infiltrate. Patient admitted to the floor, but shortly he coded with a cardiac arrest.  He had short CPR and has to be intubated. He regained vital signs, blood pressure shortly after that. No medication was given. It was noted that his urine output has been decreasing and creatinine has been rising. Creatinine was 1.05 on the admission that is on 2018. The next day, up to 1.732 and today up to 1.9.  Echocardiogram showed low ejection fraction at 35-45%. Troponin was high, up from 1.68 to 11.49. The BNP yesterday was 6.99. The patient is currently on Levophed due to the severe hypertension and getting IV fluid with normal saline at 150 mL per hour. Also being treated for possible pneumonia with azithromycin and ceftriaxone. PREVIOUS MEDICAL HISTORY:  No history of hypertension, diabetes. No history of coronary artery disease. PAST SURGICAL HISTORY:  History of cataract removal, tonsillectomy, vasectomy. SOCIAL HISTORY:  The patient used to smoke, quit 5 years ago. ALLERGIES:   ANTIHISTAMINES, IMODIUM, TETRACYCLINE, ALL GIVES HIVES. CURRENT MEDICATIONS:  Aspirin 300 mg a day, heparin 5000 units subQ q.8, he received magnesium sulfate. Methylprednisone 40 mg IV q.12, Protonix 40 mg IV once a day, Zosyn 3.375 grams q.8.   Also, vancomycin 1000 IV. Azithromycin and Rocephin were discontinued. He is on IV fluid at 150 mL per hour normal saline. Levophed also. PHYSICAL EXAMINATION:  VITAL SIGNS:  Temperature is 98.9, heart rate 96, blood pressure now is 100/57. The patient is on the vent with FIO2 of 35%. ABG showing pH 7.41, pCO2 of 34, bicarb 21 and pO2 88. GENERAL:  The patient does not respond to verbal stimulus. He barely responds to deep stimulus. The pupils seems to be a little bit dilated. NECK:  No lymphadenopathy or thyromegaly. LUNGS:  There is a poor thoracic expansion. No wheezing, no crackles are heard. Maybe some decreased breath sounds. HEART:  Regular rhythm. Systolic murmur II/VI at left sternal border. No rubs. ABDOMEN:  Soft, no rebound, no organomegaly, no mass. EXTREMITIES:  There is 2-3+ edema. IMPRESSION:    1. Acute kidney injury which is oliguric, may be anuric now, most likely ATN due to the IV contrast, cardiac arrest and shock. 2.  Altered mental status. There is concern of anoxic brain injury. 3.  Volume status may be a little bit high, BNP is elevated at 699 and the patient has a low ejection fraction on the echocardiogram.  We need to recheck the BNP to decide about IV fluid if the BNP is much higher, IV fluid needs to be decreased or stopped. 4.  If the renal function is worse tomorrow and possibility of dialysis, patient would need to be transferred to [facility]. The case would be discussed with the family prior to that.       MD HELEN Posey /   D: 04/07/2018 14:36     T: 04/07/2018 15:39  JOB #: [id number]

## 2018-04-07 NOTE — PROGRESS NOTES
Hung Guardian  Admission Date: 4/5/2018             Daily Progress Note: 4/7/2018    The patient's chart is reviewed and the patient is discussed with the staff. 77yo WM presented with cough, dyspnea, n/v/d. CT with mild infiltrates, R effusion. Admitted for CAP. Arrested shortly after arriving to floor with brief compressions, intubation, and ROSC. Subjective:     Patient remains intubated. Not on any sedatives but minimal movement of left side. Off levophed at present. Had CT head performed without focal abnormality. On PRVC with 35% FiO2. Current Facility-Administered Medications   Medication Dose Route Frequency    piperacillin-tazobactam (ZOSYN) 4.5 g in 0.9% sodium chloride (MBP/ADV) 100 mL MBP  4.5 g IntraVENous Q8H    vancomycin (VANCOCIN) 1,000 mg in 0.9% sodium chloride (MBP/ADV) 250 mL  1 g IntraVENous Q24H    aspirin (ASA) suppository 300 mg  300 mg Rectal DAILY    pantoprazole (PROTONIX) 40 mg in sodium chloride 0.9% 10 mL injection  40 mg IntraVENous DAILY    sodium chloride (NS) flush 5-10 mL  5-10 mL IntraVENous Q8H    sodium chloride (NS) flush 5-10 mL  5-10 mL IntraVENous PRN    0.9% sodium chloride infusion  100 mL/hr IntraVENous CONTINUOUS    acetaminophen (TYLENOL) tablet 650 mg  650 mg Oral Q4H PRN    HYDROcodone-acetaminophen (NORCO) 5-325 mg per tablet 1 Tab  1 Tab Oral Q4H PRN    ondansetron (ZOFRAN) injection 4 mg  4 mg IntraVENous Q4H PRN    heparin (porcine) injection 5,000 Units  5,000 Units SubCUTAneous Q8H    methylPREDNISolone (PF) (SOLU-MEDROL) injection 40 mg  40 mg IntraVENous Q12H    albuterol-ipratropium (DUO-NEB) 2.5 MG-0.5 MG/3 ML  3 mL Nebulization Q4H PRN    NOREPINephrine (LEVOPHED) 4 mg in 5% dextrose 250 mL infusion  2-30 mcg/min IntraVENous TITRATE    propofol (DIPRIVAN) infusion  0-50 mcg/kg/min IntraVENous TITRATE       Review of Systems  Unobtainable due to patient status.     Objective:     Vitals:    04/07/18 0600 04/07/18 0609 04/07/18 0632 04/07/18 0741   BP:  131/67     Pulse:  100  92   Resp:  15  15   Temp:    98 °F (36.7 °C)   SpO2: 96%   95%   Weight:   133 lb 12.8 oz (60.7 kg)    Height:         Intake and Output:   04/05 1901 - 04/07 0700  In: 4814.2 [I.V.:4814.2]  Out: 135 [Urine:135]       Physical Exam:   Constitution:  the patient is well developed and in no acute distress  EENMT:  Sclera clear, pupils equal, oral mucosa moist  Respiratory: Intubated, CTA B, no w/r/r  Cardiovascular:  RRR without M,G,R  Gastrointestinal: soft and non-tender; with positive bowel sounds. Musculoskeletal: warm without cyanosis. There is no lower leg edema. Skin:  no jaundice or rashes, no wounds   Neurologic: no voluntary movement at present     Psychiatric:  obtunded    CHEST XRAY:   4/7/18  IMPRESSION: COPD with bibasilar atelectasis or pneumonia with interval worsening  on the left.       LAB  No lab exists for component: [place]   Recent Labs      04/07/18   0336  04/06/18   0309  04/06/18   0041  04/05/18   1735   WBC  13.8*  17.0*  13.8*  12.0*   HGB  11.4*  12.5*  12.3*  14.5   HCT  34.5*  38.1*  38.2*  44.6   PLT  185  262  231  257     Recent Labs      04/07/18   0336  04/06/18   1424  04/06/18   0900  04/06/18   0309  04/06/18   0041  04/05/18   1735   NA  144   --    --   142  144  145   K  4.1   --    --   4.9  4.6  4.4   CL  106   --    --   103  106  102   CO2  25   --    --   26  26  33*   GLU  139*   --    --   165*  140*  104*   BUN  46*   --    --   38*  36*  35*   CREA  1.90*   --    --   1.41  1.32  1.05   MG  1.9  1.7*  1.3*   --    --    --    CA  7.4*   --    --   7.2*  7.5*  9.3   ALB   --    --    --    --    --   3.1*   SGOT   --    --    --    --    --   18     Recent Labs      04/07/18   0310  04/06/18   0500  04/05/18   2330   PH  7.41  7.41  7.27*   PCO2  34*  35  41   PO2  88  116*  268*   HCO3  21*  22  19*       MICRO  4/5 Blood - 1/2 GPC    Assessment:  (Medical Decision Making)     Hospital Problems Never Reviewed          Codes Class Noted POA    Bacteremia ICD-10-CM: R78.81  ICD-9-CM: 790.7  4/7/2018 Unknown        Acute respiratory failure with hypoxia [facility]) ICD-10-CM: J96.01  ICD-9-CM: 518.81  4/6/2018 Unknown        Encephalopathy ICD-10-CM: G93.40  ICD-9-CM: 348.30  4/6/2018 Unknown        Pleural effusion ICD-10-CM: J90  ICD-9-CM: 511.9  4/6/2018 Unknown        Cardiac arrest [facility]) ICD-10-CM: I46.9  ICD-9-CM: 427.5  4/6/2018 Unknown        * (Principal)Pneumonia of right lower lobe due to infectious organism [facility]) ICD-10-CM: J18.1  ICD-9-CM: 498  4/5/2018 Unknown            Patient with respiratory failure s/p cardiac arrest. Mild RLL infiltrate on CT scan with adjacent pleural effusion. WBC and pct elevated. Plan:  (Medical Decision Making)   -will change cef/azithro to zosyn in addition to vanc given the severity of his illness.   -cont vent support and monitor mental status, minimizing sedating medications.    -f/u final speciation of GPC to see if looks real or like contaminate      Joseline Polanco MD

## 2018-04-07 NOTE — PROGRESS NOTES
[facility] CARDIOLOGY PROGRESS NOTE           4/7/2018 3:54 PM    Admit Date: 4/5/2018      Subjective:     Some o/n issues with low BPs and required levophed but off currently; also fluid boluses. Currently noted on 150cc/hr NS. On vent on no sedation and no significant response. ROS:  Cardiovascular:  As noted above    Objective:      Vitals:    04/07/18 1009 04/07/18 1109 04/07/18 1145 04/07/18 1209   BP: 117/64 102/55  100/57   Pulse: 82 96  96   Resp: 16 15  15   Temp:   98.9 °F (37.2 °C)    SpO2: 97% 98%  99%   Weight:       Height:           Physical Exam:  General-Intubated  Neck- supple, no JVD  CV- regular rate and rhythm no MRG  Lung- dec at bases  Abd- soft, nontender, nondistended  Ext- 1+ edema bilaterally. Skin- warm and dry    Data Review:   Recent Labs      04/07/18   0336  04/06/18   1424   04/06/18   0711  04/06/18   0309  04/06/18   0041   NA  144   --    --    --   142  144   K  4.1   --    --    --   4.9  4.6   MG  1.9  1.7*   < >   --    --    --    BUN  46*   --    --    --   38*  36*   CREA  1.90*   --    --    --   1.41  1.32   GLU  139*   --    --    --   165*  140*   WBC  13.8*   --    --    --   17.0*  13.8*   HGB  11.4*   --    --    --   12.5*  12.3*   HCT  34.5*   --    --    --   38.1*  38.2*   PLT  185   --    --    --   262  231   TROIQ   --    --    --   11.49*   --   1.68*    < > = values in this interval not displayed. Assessment/Plan:     Principal Problem:    Pneumonia of right lower lobe due to infectious organism ([facility] (4/5/2018)    Active Problems:    Acute respiratory failure with hypoxia ([facility] (4/6/2018)      Encephalopathy (4/6/2018)      Pleural effusion (4/6/2018)      Cardiac arrest ([address] (4/6/2018)      Bacteremia (4/7/2018)      Cardiac arrest ([facility] (0/9/9866)- uncertain etiology with no strips noted; ? PEA but per records, quick ROSC. Echo with moderately impaired left ventricular function.  Elevated troponin possibly demand related in the setting of code but cannot rule out underlying CAD. Based on neurological outcomes, will address further workup. Currently LV dysfunction meds limited by hypotension. Continue ASA. Cautious with fluids with LV dysfunction. Noted 1/2 GPC-?contaminant and pending final. On abx for PNA. Worsening renal fn; renal evaluating but possible etiologies pre-renal with hypotension/contrast induced. Further recommendations pending clinicial course.      Lesly Berkowitz MD  4/7/2018 3:54 PM

## 2018-04-07 NOTE — PROGRESS NOTES
Vancomycin Consult (Day 2)    MD ordering: Rebecca GOLDMAN following? no  Indication: CAP  Goal level(s): 15 - 20    Allergies as of 2018 - Review Complete 2018   Allergen Reaction Noted    Antihistamines - alkylamine Hives 2018    Imodium [loperamide] Hives 2018    Tetracycline Hives 2018     Current Antimicrobial Therapy (168h ago through future)      Ordered     Start Stop      18 0930  piperacillin-tazobactam (ZOSYN) 3.375 g in 0.9% sodium chloride (MBP/ADV) 100 mL  3.375 g,   IntraVENous,   EVERY 8 HOURS      18 1000 --    18 0908  vancomycin (VANCOCIN) 1,000 mg in 0.9% sodium chloride (MBP/ADV) 250 mL  1 g,   IntraVENous,   EVERY 24 HOURS      18 0000 --            All Micro Results       Procedure Component Value Units Date/Time      CULTURE, BLOOD [[id number]] Collected:  18    Order Status:  Completed Specimen:  Whole Blood from Blood Updated:  1828     Special Requests: --        RIGHT  FOREARM       Culture result: NO GROWTH 2 DAYS       CULTURE, BLOOD [[id number]] Collected:  186    Order Status:  Completed Specimen:  Blood from Blood Updated:  18    CULTURE, BLOOD [[id number]] Collected:  18    Order Status:  Completed Specimen:  Blood from Blood Updated:  18    CULTURE, BLOOD [[id number]] Collected:  18    Order Status:  Completed Specimen:  Whole Blood from Blood Updated:  18 0232     Special Requests: --        LEFT  FOREARM       GRAM STAIN GRAM POSITIVE COCCI                 RESULTS VERIFIED, PHONED TO AND READ BACK BY EMILY PERES RN BY NB AT 0230 ON [id number]      AEROBIC BOTTLE POSITIVE        Culture result:         CULTURE IN [facility] UPDATES TO FOLLOW    CULTURE, RESPIRATORY/SPUTUM/BRONCH Romelia Vernon [[id number]] Collected:  18 1556    Order Status:  Completed Specimen:  Sputum from Sputum Updated:  18 2311            Temp (24hrs), Av °F (37.2 °C), Min:98 °F (36.7 °C), Max:99.9 °F (37.7 °C)    UOP: mL/kg/hr  Dosing weight: 60 kg (60 weight)  69 y.o. Date:  Dose/Freq Admin Times Level/Time:   4/6 1500 mg LD 0233    4/7 1000 mg IV Q24h 0041                  Recent Labs      04/07/18   0336  04/06/18   1424  04/06/18   1005  04/06/18   0901  04/06/18   0711  04/06/18   0309  04/06/18   0041  04/05/18   1735   BUN  46*   --    --    --    --   38*  36*  35*   CREA  1.90*   --    --    --    --   1.41  1.32  1.05   WBC  13.8*   --    --    --    --   17.0*  13.8*  12.0*   PCT   --    --    --   5.6   --    --    --    --    LAC   --   1.6  2.7*   --   3.8*   --   4.7*   --      Estimated Creatinine Clearance: 28.4 mL/min (based on Cr of 1.9). A/P:  Will continue with Vancomycin 1 gram IV every 24 hours.     Patrice ManningD, BCPS

## 2018-04-07 NOTE — PROGRESS NOTES
Problem: Nutrition Deficit  Goal: *Optimize nutritional status  Nutrition Assessment for: MST for unintentional weight loss/ eating poorly due to decreased appetite and consult for management of tube feeding     Assessment:  Patient History: Pt admitted with pneumonia, struggling with respiratory failure and cardiac arrest.  Pt intubated on vent and Dobbhoff placed. Anthropometrics:  Height: 6' 1\" (185.4 cm), Weight: 60.7 kg (133 lb 12.8 oz), Weight Source: Bed, Body mass index is 17.65 kg/(m^2). BMI class of underweight. Macronutrient needs:  EER: 1821-2124kcal/day 30-35kcal/kg CBW (Current body weight)  EPR: 48-60 g/day 0.8-1.0g/kg CBW (Current body weight) GFR 37  ECHO needs: 227-265 gm CHO/day (50% kcal needs)  Est Fluid/day- 1.8-2.1 liters/day (1 ml/kcal) or per physician prescription    Pertinent labs/hemodynamic parameters:  Lab Results   Component Value Date/Time    Potassium 4.1 04/07/2018 03:36 AM    Sodium 144 04/07/2018 03:36 AM    Magnesium 1.9 04/07/2018 03:36 AM    Glucose 139 (H) 04/07/2018 03:36 AM    Creatinine 1.90 (H) 04/07/2018 03:36 AM   , No components found for: [place]    Skin Status: Other (comment) (R hip open area)    Intake/Comparative Standards: Pt currently NPO    Nutrition Diagnosis:  Inadequate Oral Intake - Related to NPO status  / As Evidenced By pt on vent, poor intake prior to admission, BMI  17.65    Nutrition Intervention:  Meals and snacks: continue NPO    Begin tube feeds: Nepro with goal rate of 40 to provide 1728 kcals, 77 grams protein, 160 grams carbs. Start at 20ml/hour and advance 10ml Q 4 hours as tolerated, monitoring residuals. Free water flushes of 187ml Q 4 hours to provide total of 1821ml of water from TF and free water flushes. This meets 94% kcal needs, 128% protein needs, 100% fluid needs.    Discharge needs: too soon to determine       Clare Barahona, [address] [facility] [address], [address]

## 2018-04-07 NOTE — PROGRESS NOTES
Ventilator check complete; patient has a #8. 0 ET tube secured at the 24 at the lip. Patient is not sedated. Patient is not able to follow commands. Breath sounds are clear and diminished. Trachea is midline, Negative for subcutaneous air, and chest excursion is symmetric. Patient is also Negative for cyanosis and is Positive for pitting edema. All alarms are set and audible. Resuscitation bag is at the head of the bed.       Ventilator Settings  Mode FIO2 Rate Tidal Volume Pressure PEEP I:E Ratio   SIMV, PRVC, Pressure support  35 %    550 ml  10 cm H2O  8 cm H20  1:3      Peak airway pressure: 20 cm H2O   Minute ventilation: 8.5 l/min     ABG:   Recent Labs      04/07/18   0310  04/06/18   0500  04/05/18   2330   PH  7.41  7.41  7.27*   PCO2  34*  35  41   PO2  88  116*  268*   HCO3  21*  22  19*         Nory See, RT

## 2018-04-08 NOTE — PROGRESS NOTES
Ventilator check complete; patient has a #8. 0 ET tube secured at the 26 at the lip. Patient is not sedated. Patient is not able to follow commands. Breath sounds are coarse. Trachea is midline, Negative for subcutaneous air, and chest excursion is symmetric. Patient is also Negative for cyanosis and is Negative for pitting edema. All alarms are set and audible. Resuscitation bag is at the head of the bed.       Ventilator Settings  Mode FIO2 Rate Tidal Volume Pressure PEEP I:E Ratio   SIMV, Pressure support, PRVC  60 %   15 550 ml  10 cm H2O  8 cm H20  1.3      Peak airway pressure: 28 cm H2O   Minute ventilation: 8.5 l/min     ABG:   Recent Labs      04/08/18   0205  04/07/18   0310  04/06/18   0500   PH  7.38  7.41  7.41   PCO2  33*  34*  35   PO2  87  88  116*   HCO3  19*  21*  22         Yefri Ferguson, RT

## 2018-04-08 NOTE — PROGRESS NOTES
Late entry due to hands on patient care. Patient with eyes open. Patient do not track. No commands follow. At times withdraw to pain. Head of bed elevated.  cleared PCXR. Nepro tube feeding started at 10 ml/ hr per orders. Suction for small amount thick white secretion. Oxygen saturation 96 % on 35 % FIO2. Sinus rhythm with occasional PAC'S on cardiac monitor. Bilateral lower extremities with redness and edema. Toes are purple and cool to touch. Small abrasion to right hip and mid back. Allevyn peeled back. Sacral/coccyx pink and blanchable. Bed in low/lock position.

## 2018-04-08 NOTE — PROGRESS NOTES
Admit Date: 4/5/2018      Subjective:          Review of Systems  On the vent     Objective:     Patient Vitals for the past 8 hrs:   BP Temp Pulse Resp SpO2   04/08/18 1257 - - 76 15 100 %   04/08/18 1209 111/61 98.1 °F (36.7 °C) 77 15 100 %   04/08/18 1109 122/71 - 80 14 98 %   04/08/18 1009 153/75 - 78 14 100 %   04/08/18 0909 135/82 - 87 15 100 %   04/08/18 0854 - - 84 15 95 %   04/08/18 0809 119/66 - 89 14 98 %   04/08/18 0709 117/68 - 78 15 96 %     04/08 0701 - 04/08 1900  In: -   Out: 120 [Urine:120]      Physical Exam:   Follow some commends, but no movement of arms and legs  Lungs: few bronchi  CV: RR,   Abdomen: distended  Ext: +++ edema          Data Review No results found for this or any previous visit (from the past 8 hour(s)). Assessment:     Principal Problem:    Pneumonia of right lower lobe due to infectious organism ([address] (4/5/2018)    Active Problems:    Acute respiratory failure with hypoxia ([address] (4/6/2018)      Encephalopathy (4/6/2018)      Pleural effusion (4/6/2018)      Cardiac arrest ([address] (4/6/2018)      Bacteremia (4/7/2018)        Plan:     MORENO : ATN? Oliguric  Volume expanded.   F/u      Kwame Alva MD

## 2018-04-08 NOTE — PROGRESS NOTES
Ventilator check complete; patient has a #8. 0 ET tube secured at the 24 at the lip. Patient is not sedated. Patient is not able to follow commands. Breath sounds are clear and diminished. Trachea is midline, Negative for subcutaneous air, and chest excursion is symmetric. Patient is also Negative for cyanosis and is Negative for pitting edema. All alarms are set and audible. Resuscitation bag is at the head of the bed.       Ventilator Settings  Mode FIO2 Rate Tidal Volume Pressure PEEP I:E Ratio   SIMV, PRVC, Pressure support  35 %    550 ml  10 cm H2O  8 cm H20  1.3      Peak airway pressure: 17 cm H2O   Minute ventilation: 9.9 l/min     ABG:   Recent Labs      04/07/18   0310  04/06/18   0500  04/05/18   2330   PH  7.41  7.41  7.27*   PCO2  34*  35  41   PO2  88  116*  268*   HCO3  21*  22  19*         Nory See, RT

## 2018-04-08 NOTE — PROGRESS NOTES
Vancomycin Consult (Day 3)    MD ordering: Rebecca GOLDMAN following? no  Indication: CAP  Goal level(s): 15 - 20    Allergies as of 04/05/2018 - Review Complete 04/05/2018   Allergen Reaction Noted    Antihistamines - alkylamine Hives 04/05/2018    Imodium [loperamide] Hives 04/05/2018    Tetracycline Hives 04/05/2018     Current Antimicrobial Therapy (168h ago through future)      Ordered     Start Stop      04/07/18 0930  piperacillin-tazobactam (ZOSYN) 3.375 g in 0.9% sodium chloride (MBP/ADV) 100 mL  3.375 g,   IntraVENous,   EVERY 8 HOURS      04/07/18 1000 --    04/06/18 0908  vancomycin (VANCOCIN) 1,000 mg in 0.9% sodium chloride (MBP/ADV) 250 mL  1 g,   IntraVENous,   EVERY 24 HOURS      04/07/18 0000 --            All Micro Results       Procedure Component Value Units Date/Time      CULTURE, RESPIRATORY/SPUTUM/BRONCH Shabnam Reynaldo STAIN [[id number]] Collected:  04/06/18 1556    Order Status:  Completed Specimen:  Sputum from Sputum Updated:  04/08/18 1047     Special Requests: NO SPECIAL REQUESTS        GRAM STAIN 6 TO 48 WBC'S/OIF      0 TO 2  EPITHELIAL /OIF      RARE GRAM POSITIVE COCCI         4+ MUCUS PRESENT        Culture result:         SCANT NORMAL RESPIRATORY TRISTON    CULTURE, BLOOD [[id number]] Collected:  04/07/18 0336    Order Status:  Completed Specimen:  Blood from Blood Updated:  04/08/18 1043     Special Requests: --        RIGHT  FOREARM       Culture result: NO GROWTH 1 DAY       CULTURE, BLOOD [[id number]] Collected:  04/07/18 0342    Order Status:  Completed Specimen:  Blood from Blood Updated:  04/08/18 1043     Special Requests: --        LEFT  HAND       Culture result: NO GROWTH 1 DAY       CULTURE, BLOOD [[id number]] Collected:  04/05/18 2013    Order Status:  Completed Specimen:  Whole Blood from Blood Updated:  04/08/18 1043     Special Requests: --        RIGHT  FOREARM       Culture result: NO GROWTH 3 DAYS       CULTURE, BLOOD [[id number]]  (Abnormal) Collected:  04/05/18 2014    Order Status:  Completed Specimen:  Whole Blood from Blood Updated:  18     Special Requests: --        LEFT  FOREARM       GRAM STAIN GRAM POSITIVE COCCI                 RESULTS VERIFIED, PHONED TO AND READ BACK BY EMILY PERES RN BY NB AT 0230 ON [id number]      AEROBIC BOTTLE POSITIVE        Culture result: GRAM POSITIVE COCCI (A)                 IDENTIFICATION AND SUSCEPTIBILITY TO FOLLOW            Temp (24hrs), Av.5 °F (36.4 °C), Min:96.3 °F (35.7 °C), Max:98.9 °F (37.2 °C)    UOP: mL/kg/hr  Dosing weight: 63 kg (63 weight)  69 y.o. Date:  Dose/Freq Admin Times Level/Time:    1500 mg LD 0233     1000 mg Q24h 0041     1000 mg Q24h 0353       Trough to be ordered     Recent Labs      18   0359  18   0336  18   1424  18   1005  18   0901  18   0711  18   0309  18   0041  18   1735   BUN  59*  46*   --    --    --    --   38*  36*  35*   CREA  2.23*  1.90*   --    --    --    --   1.41  1.32  1.05   WBC  13.2*  13.8*   --    --    --    --   17.0*  13.8*  12.0*   PCT   --    --    --    --   5.6   --    --    --    --    LAC   --    --   1.6  2.7*   --   3.8*   --   4.7*   --      Estimated Creatinine Clearance: 25.4 mL/min (based on Cr of 2.23). A/P:  Will continue with Vancomycin 1000 mg IV every 24 hours.     Vitaliy ManningD, BCPS

## 2018-04-08 NOTE — PROGRESS NOTES
Pt clearly following commands to blink eyes. Not moving any extremities to command. He is coughing continously and having continuous high peak airway pressures on the vent. He has been suctioned, he has bilateral coarse breath sounds and symmetrical chest expansion. I called Dr. Prachi Olivas and updated her:  2mg morphine prn order received.

## 2018-04-08 NOTE — PROGRESS NOTES
Sybil Dc  Admission Date: 4/5/2018             Daily Progress Note: 4/8/2018    The patient's chart is reviewed and the patient is discussed with the staff. 75yo WM presented with cough, dyspnea, n/v/d. CT with mild infiltrates, R effusion. Admitted for CAP. Arrested shortly after arriving to floor with brief compressions, intubation, and ROSC. Subjective:     Patient remains intubated. No spontaneous movement noted. Would not follow commands. Will open eyes but not tracking. Forcefully keeps eyes closed when tried to manually open them. Current Facility-Administered Medications   Medication Dose Route Frequency    piperacillin-tazobactam (ZOSYN) 3.375 g in 0.9% sodium chloride (MBP/ADV) 100 mL  3.375 g IntraVENous Q8H    vancomycin (VANCOCIN) 1,000 mg in 0.9% sodium chloride (MBP/ADV) 250 mL  1 g IntraVENous Q24H    aspirin (ASA) suppository 300 mg  300 mg Rectal DAILY    pantoprazole (PROTONIX) 40 mg in sodium chloride 0.9% 10 mL injection  40 mg IntraVENous DAILY    sodium chloride (NS) flush 5-10 mL  5-10 mL IntraVENous Q8H    sodium chloride (NS) flush 5-10 mL  5-10 mL IntraVENous PRN    0.9% sodium chloride infusion  100 mL/hr IntraVENous CONTINUOUS    acetaminophen (TYLENOL) tablet 650 mg  650 mg Oral Q4H PRN    HYDROcodone-acetaminophen (NORCO) 5-325 mg per tablet 1 Tab  1 Tab Oral Q4H PRN    ondansetron (ZOFRAN) injection 4 mg  4 mg IntraVENous Q4H PRN    heparin (porcine) injection 5,000 Units  5,000 Units SubCUTAneous Q8H    methylPREDNISolone (PF) (SOLU-MEDROL) injection 40 mg  40 mg IntraVENous Q12H    albuterol-ipratropium (DUO-NEB) 2.5 MG-0.5 MG/3 ML  3 mL Nebulization Q4H PRN    NOREPINephrine (LEVOPHED) 4 mg in 5% dextrose 250 mL infusion  2-30 mcg/min IntraVENous TITRATE    propofol (DIPRIVAN) infusion  0-50 mcg/kg/min IntraVENous TITRATE       Review of Systems  Unobtainable due to patient status.     Objective:     Vitals:    04/08/18 0309 04/08/18 0409 04/08/18 0510 04/08/18 0522   BP: 116/64 105/61 123/69    Pulse: 97 84 76    Resp: 16 17 15    Temp:  97.5 °F (36.4 °C)     SpO2:   99%    Weight:    140 lb 8 oz (63.7 kg)   Height:         Intake and Output:   04/06 1901 - 04/08 0700  In: 5655.3 [I.V.:5133.3]  Out: 370 [Urine:370]       Physical Exam:   Constitution:  the patient is well developed and in no acute distress  EENMT:  Sclera clear, pupils equal, oral mucosa moist  Respiratory: Intubated, CTA B, no w/r/r  Cardiovascular:  RRR without M,G,R  Gastrointestinal: soft and non-tender; with positive bowel sounds. Musculoskeletal: warm without cyanosis. There is no lower leg edema. Skin:  no jaundice or rashes, no wounds   Neurologic: opens eyes but does not track. No other movement seen. Psychiatric:  awake    CHEST XRAY:   4/7/18  IMPRESSION: COPD with bibasilar atelectasis or pneumonia with interval worsening  on the left. LAB  No lab exists for component: [place]   Recent Labs      04/08/18   0359  04/07/18   0336  04/06/18   0309  04/06/18   0041   WBC  13.2*  13.8*  17.0*  13.8*   HGB  11.3*  11.4*  12.5*  12.3*   HCT  34.5*  34.5*  38.1*  38.2*   PLT  171  185  262  231     Recent Labs      04/08/18   0359  04/07/18   0336  04/06/18   1424  04/06/18   0900  04/06/18   0309   04/05/18   1735   NA  144  144   --    --   142   < >  145   K  4.1  4.1   --    --   4.9   < >  4.4   CL  108*  106   --    --   103   < >  102   CO2  24  25   --    --   26   < >  33*   GLU  149*  139*   --    --   165*   < >  104*   BUN  59*  46*   --    --   38*   < >  35*   CREA  2.23*  1.90*   --    --   1.41   < >  1.05   MG   --   1.9  1.7*  1.3*   --    --    --    CA  7.3*  7.4*   --    --   7.2*   < >  9.3   ALB   --    --    --    --    --    --   3.1*   SGOT   --    --    --    --    --    --   18    < > = values in this interval not displayed.      Recent Labs      04/08/18   0205  04/07/18   0310  04/06/18   0500   PH  7.38  7.41  7.41   PCO2  33* 34*  35   PO2  87  88  116*   HCO3  19*  21*  22       MICRO  4/5 Blood - 1/2 GPC    Assessment:  (Medical Decision Making)     Hospital Problems  Never Reviewed          Codes Class Noted POA    Bacteremia ICD-10-CM: R78.81  ICD-9-CM: 790.7  4/7/2018 Unknown        Acute respiratory failure with hypoxia (HCC) ICD-10-CM: J96.01  ICD-9-CM: 518.81  4/6/2018 Unknown        Encephalopathy ICD-10-CM: G93.40  ICD-9-CM: 348.30  4/6/2018 Unknown        Pleural effusion ICD-10-CM: J90  ICD-9-CM: 511.9  4/6/2018 Unknown        Cardiac arrest [facility]) ICD-10-CM: I46.9  ICD-9-CM: 427.5  4/6/2018 Unknown        * (Principal)Pneumonia of right lower lobe due to infectious organism [facility]) ICD-10-CM: J18.1  ICD-9-CM: 486  4/5/2018 Unknown            Patient with respiratory failure s/p cardiac arrest. Mild RLL infiltrate on CT scan with adjacent pleural effusion. WBC and pct elevated. On 60% satting low 90's now. Plan:  (Medical Decision Making)   -will cont zosyn in addition to vanc given the severity of his illness to cover for possible PNA. -repeat CXR now given his worsening oxygenation and daily CXR and ABG's.  -cont vent support and monitor mental status, minimizing sedating medications.    -f/u final speciation of GPC to see if looks real or like contaminate      Guadalupe Laguerre MD

## 2018-04-08 NOTE — PROGRESS NOTES
Hospitalist Progress Note    2018  Admit Date: 2018  5:31 PM   NAME: Severo Harp   :  1940   MRN:  [id number]   Attending: An Wilson MD  [facility]  [facility]    SUBJECTIVE:     Severo Harp is a 70 yo male with unremarkable medical history admitted initially with resp distress, PNA and new left pulm nodule and hilar lymph node on CT scan. Patient has cardiopulmonary arrest immediately on medical floor and required intubation. ROSC was achieved quickly and no medications used. Anesthesiology placed central and arterial line and patient transported to ICU on vent with sedation. Pulmonary consulted for vent management. Today: Intubated off sedation and pressors. He opens eyes spontaneously and with commands but not tracking. PHYSICAL EXAM       Visit Vitals    /71    Pulse 80    Temp 97.5 °F (36.4 °C)    Resp 14    Ht 6' 1\" (1.854 m)    Wt 63.7 kg (140 lb 8 oz)    SpO2 98%    BMI 18.54 kg/m2      Temp (24hrs), Av.2 °F (36.2 °C), Min:96.3 °F (35.7 °C), Max:97.6 °F (36.4 °C)    Oxygen Therapy  O2 Sat (%): 98 % (18 1109)  Pulse via Oximetry: 69 beats per minute (18 1109)  O2 Device: Endotracheal tube (18 0409)  FIO2 (%): 60 % (18 0854)    Intake/Output Summary (Last 24 hours) at 18 1201  Last data filed at 18 1124   Gross per 24 hour   Intake          3888.66 ml   Output              445 ml   Net          3443.66 ml          General: Intubated on vent. Frail and cachetic  Head:  Atraumatic Normocephalic. Eyes:  Pupils L>R and reactive bilat  ENT:  No discharges/lesions. Lungs:  CTA Bilaterally. CVS:  Regular rate and rhythm,  No murmur, rub, or gallop, No JVD, No lower extremity edema. Abdomen: Soft, Non distended, Positive bowel sounds. MSK:  No deformities, lesions,   Neurologic:  Spontaneously opens eyes.  No spontaneous limb movement  Skin:   No rash      Recent Results (from the past 24 hour(s))   BNP    Collection Time: 04/07/18  4:08 PM   Result Value Ref Range     pg/mL   BLOOD GAS, ARTERIAL    Collection Time: 04/08/18  2:05 AM   Result Value Ref Range    pH 7.38 7.35 - 7.45      PCO2 33 (L) 35 - 45 mmHg    PO2 87 80 - 105 mmHg    BICARBONATE 19 (L) 22 - 26 mmol/L    BASE DEFICIT 4.8 (H) 0 - 2 mmol/L    SITE RR     ALLENS TEST POSITIVE      MODE SIMV PRVC     Tidal volume 550.0      RATE 15.0      PEEP/CPAP 8.0      Respiratory comment:       Christopher Pichardo Rn at 4 8 2018 2 11 39 AM. Read back. PLEASE READ & DOCUMENT PPD TEST IN 48 HRS    Collection Time: 04/08/18  3:10 AM   Result Value Ref Range    PPD  Negative    mm Induration  mm   CBC WITH AUTOMATED DIFF    Collection Time: 04/08/18  3:59 AM   Result Value Ref Range    WBC 13.2 (H) 4.3 - 11.1 K/uL    RBC 3.56 (L) 4.23 - 5.67 M/uL    HGB 11.3 (L) 13.6 - 17.2 g/dL    HCT 34.5 (L) 41.1 - 50.3 %    MCV 96.9 79.6 - 97.8 FL    MCH 31.7 26.1 - 32.9 PG    MCHC 32.8 31.4 - 35.0 g/dL    RDW 14.2 11.9 - 14.6 %    PLATELET 939 030 - 284 K/uL    MPV 10.9 10.8 - 14.1 FL    DF AUTOMATED      NEUTROPHILS 94 (H) 43 - 78 %    LYMPHOCYTES 1 (L) 13 - 44 %    MONOCYTES 4 4.0 - 12.0 %    EOSINOPHILS 0 (L) 0.5 - 7.8 %    BASOPHILS 0 0.0 - 2.0 %    IMMATURE GRANULOCYTES 1 0.0 - 5.0 %    ABS. NEUTROPHILS 12.5 (H) 1.7 - 8.2 K/UL    ABS. LYMPHOCYTES 0.1 (L) 0.5 - 4.6 K/UL    ABS. MONOCYTES 0.5 0.1 - 1.3 K/UL    ABS. EOSINOPHILS 0.0 0.0 - 0.8 K/UL    ABS. BASOPHILS 0.0 0.0 - 0.2 K/UL    ABS. IMM.  GRANS. 0.1 0.0 - 0.5 K/UL   METABOLIC PANEL, BASIC    Collection Time: 04/08/18  3:59 AM   Result Value Ref Range    Sodium 144 136 - 145 mmol/L    Potassium 4.1 3.5 - 5.1 mmol/L    Chloride 108 (H) 98 - 107 mmol/L    CO2 24 21 - 32 mmol/L    Anion gap 12 7 - 16 mmol/L    Glucose 149 (H) 65 - 100 mg/dL    BUN 59 (H) 8 - 23 MG/DL    Creatinine 2.23 (H) 0.8 - 1.5 MG/DL    GFR est AA 37 (L) >60 ml/min/1.73m2    GFR est non-AA 31 (L) >60 ml/min/1.73m2    Calcium 7.3 (L) 8.3 - 10.4 MG/DL Imaging /Procedures /Studies   CT Chest IMPRESSION  Impression:   1. No evidence of PE.  2. Emphysema. Right greater than left infiltrates and pleural effusions. 3. Indeterminate left upper lobe nodule and right hilar lymph node. 4. Cardiomegaly, atherosclerotic vascular disease. Echocardiogram SUMMARY:  -  Left ventricle: Systolic function was moderately reduced. Ejection   Fraction was estimated in the range of 35 % to 45 %. -  Right ventricle: The ventricle was markedly dilated. Systolic function was  markedly reduced. -  Aortic valve: Aortic valve is thickened and calcified with possible mild  stenosis (difficult visualization) but does not appear to be severe. There   Was mild regurgitation. ASSESSMENT      Hospital Problems as of 4/8/2018  Never Reviewed          Codes Class Noted - Resolved POA    Bacteremia ICD-10-CM: R78.81  ICD-9-CM: 790.7  4/7/2018 - Present Unknown        Acute respiratory failure with hypoxia ([facility] [address] ICD-10-CM: J96.01  ICD-9-CM: 518.81  4/6/2018 - Present Unknown        Encephalopathy ICD-10-CM: G93.40  ICD-9-CM: 348.30  4/6/2018 - Present Unknown        Pleural effusion ICD-10-CM: J90  ICD-9-CM: 511.9  4/6/2018 - Present Unknown        Cardiac arrest [facility]) ICD-10-CM: I46.9  ICD-9-CM: 427.5  4/6/2018 - Present Unknown        * (Principal)Pneumonia of right lower lobe due to infectious organism [facility]) ICD-10-CM: J18.1  ICD-9-CM: 486  4/5/2018 - Present Unknown            Cardioplumonary arrest:  Continue Telemetry. Troponin elevated post resuscitation. Echo shows reduced EF of 35-45%. Cardiology consulted and holding further workup until more stable    Acute Hypoxic Resp Failure: Continue Vent and wean as tolerated. Pulm managing and input appreciated    Pneumonia:  Cont Zosyn and continue Vancomycin. Final cultures pending    Acute kidney injury/Oliguria: minimal urine output since admission despite IVF.  Nephrology consulted and considering Dialysis if urine output not improved. Creatinine worsening    Systolic Congestive Heart Failure:EF of 35-45% . Will monitor fluid and pulm status closely     Hypotension: Pressors currently off. Titrate pressors to maintain MAP >65     Emphysema: seen on CT scan. No prior diagnosis of COPD. Chronic smoker. Cont on solumedrol, duonebs and IV abx.      General symptoms of fatigue and N/V/D: Will cont supportive management and monitor.      Bilateral small pleural effusions. 7mm left upper lobe nodule and right hilar lymph node 1.6 x 1.3 cm: Pulmonary input appreciated. Will need further workup for malignancy     Cachexia and malnutrition: Place NGT and start tube feeds.     Chronic former smoker: Reports quit ~ 5 years ago.      DVT ppx:  SQH.    Code status:  Full  Risk:  high      Halley Vidal MD

## 2018-04-09 PROBLEM — E87.70 VOLUME OVERLOAD: Status: ACTIVE | Noted: 2018-01-01

## 2018-04-09 NOTE — PROGRESS NOTES
[facility] CARDIOLOGY PROGRESS NOTE           4/9/2018 10:20 AM    Admit Date: 4/5/2018      Subjective:   Remains intubated. Opens are open. ROS:  GEN:  No fever or chills  Cardiovascular:  As noted above  Pulmonary:  As noted above  Neuro:  No new focal motor or sensory loss    Objective:      Vitals:    04/09/18 0818 04/09/18 0830 04/09/18 0842 04/09/18 0852   BP:       Pulse: 66 69 73 74   Resp: 9 10 12 12   Temp:       SpO2: 98% 98%  95%   Weight:       Height:           Physical Exam:  General-eyes are open . Remains intubated. Neck- supple, no JVD  CV- regular rate and rhythm no MRG  Lung- clear bilaterally  Abd- soft, nontender, nondistended  Ext- 1-2 + pedal  edema bilaterally. Skin- warm and dry  Psychiatric: calm  Neurologic:  Alert       Data Review:   Recent Labs      04/09/18   0445  04/08/18   0359  04/07/18   0336   04/06/18   1424   NA  144  144  144   < >   --    K  3.9  4.1  4.1   < >   --    MG   --    --   1.9   --   1.7*   BUN  69*  59*  46*   < >   --    CREA  2.16*  2.23*  1.90*   < >   --    GLU  183*  149*  139*   < >   --    WBC  10.2  13.2*  13.8*   < >   --    HGB  11.1*  11.3*  11.4*   < >   --    HCT  33.5*  34.5*  34.5*   < >   --    PLT  151  171  185   < >   --     < > = values in this interval not displayed. TELEMETRY:  NSR    Assessment/Plan:     Principal Problem:    Pneumonia of right lower lobe due to infectious organism [facility]) (4/5/2018):per Pulmonary Medicine. Active Problems:    Acute respiratory failure with hypoxia ([facility] [address] (4/6/2018):per Pulmonary Medicine. Encephalopathy (4/6/2018):Neurology consulted      Pleural effusion (4/6/2018)      Cardiac arrest ([facility] [address] (4/6/2018):?Etiology. Continue support. Conservative medical therapy at present time. Bacteremia (4/7/2018):per primary team.      Volume overload (4/9/2018):IV lasix as needed.                 Bryce Palafox MD  4/9/2018 10:20 AM

## 2018-04-09 NOTE — PROGRESS NOTES
Ventilator check complete; patient has a #8. 0 ET tube secured at the 26 at the lip. Patient is not sedated. Patient is not able to follow commands. Breath sounds are coarse. Trachea is midline, Negative for subcutaneous air, and chest excursion is symmetric. Patient is also Negative for cyanosis and is Negative for pitting edema. All alarms are set and audible. Resuscitation bag is at the head of the bed.       Ventilator Settings  Mode FIO2 Rate Tidal Volume Pressure PEEP I:E Ratio   SIMV, PRVC, Pressure support  40 %    520 ml  12 cm H2O  8 cm H20  1.3      Peak airway pressure: 28 cm H2O   Minute ventilation: 7.7 l/min     ABG:   Recent Labs      04/09/18   0240  04/08/18   0205  04/07/18   0310   PH  7.40  7.38  7.41   PCO2  37  33*  34*   PO2  112*  87  88   HCO3  23  19*  21*         Nory See, RT

## 2018-04-09 NOTE — PROGRESS NOTES
Patient up in Semi abdi position with eyes open. No commands follow. Sinus rhythm on cardiac monitor. Heart rate 85. Patient remain intubated. No sedation. Suction for small amount of thick white secretion. Ecchymosis to extremities. Right arm weeping. Hands and feet are cool to touch. Positive doppler signals to lower extremities. Air bolus 30 ml given to confirm placement of tube feeding. No residual. Allevyn peeled back. Sacral/coccyx slightly red,but blanchable. Heels red and boggy. Prevalon boots in place. Bed in low/lock position. Bed alarm on.

## 2018-04-09 NOTE — PROGRESS NOTES
Pt. Restless,  Moving head side to side especially when turned on his side. Glaucoma eyedrops ordered. Patient's friend Lam Grady found his eyedrops. Encouraged her to talk to pt. Fearful of the alarms and noises in the room.   Explained to her that this is normal.

## 2018-04-09 NOTE — PROGRESS NOTES
Weaning pt per MD order. Ventilator check complete; patient has a #8. 0 ET tube secured at the 26 at the lip. Patient is not sedated. Patient is some what able to follow commands. Breath sounds are clear. Trachea is midline, Negative for subcutaneous air, and chest excursion is symmetric. Patient is also Negative for cyanosis and is Negative for pitting edema. All alarms are set and audible. Resuscitation bag is at the head of the bed.       Ventilator Settings  Mode FIO2 Rate Tidal Volume Pressure PEEP I:E Ratio   Pressure support  35 % (weaned from 40%)    0 ml  10 cm H2O (decreased from 12 cmH2O)  8 cm H20  1.3      Peak airway pressure: 18 cm H2O   Minute ventilation: 6 l/min     ABG:   Recent Labs      04/09/18   0240  04/08/18   0205  04/07/18   0310   PH  7.40  7.38  7.41   PCO2  37  33*  34*   PO2  112*  87  88   HCO3  23  19*  21*         Felipa Rodas, RT

## 2018-04-09 NOTE — PROGRESS NOTES
[facility]    Follow-Up on:MORENO    HPI: Intubated/ eyes open    ROS:  Denies CP, SOB.     Current Facility-Administered Medications   Medication Dose Route Frequency    [START ON 4/10/2018] methylPREDNISolone (PF) (SOLU-MEDROL) injection 40 mg  40 mg IntraVENous DAILY    VANCOMYCIN INFORMATION NOTE   Other ONCE    NUTRITIONAL SUPPORT ELECTROLYTE PRN ORDERS   Does Not Apply PRN    furosemide (LASIX) injection 80 mg  80 mg IntraVENous ONCE    morphine injection 2 mg  2 mg IntraVENous Q3H PRN    white Petrolatum-Mineral Oil (AKWA TEARS) ophthalmic ointment   Both Eyes PRN    piperacillin-tazobactam (ZOSYN) 3.375 g in 0.9% sodium chloride (MBP/ADV) 100 mL  3.375 g IntraVENous Q8H    vancomycin (VANCOCIN) 1,000 mg in 0.9% sodium chloride (MBP/ADV) 250 mL  1 g IntraVENous Q24H    aspirin (ASA) suppository 300 mg  300 mg Rectal DAILY    pantoprazole (PROTONIX) 40 mg in sodium chloride 0.9% 10 mL injection  40 mg IntraVENous DAILY    sodium chloride (NS) flush 5-10 mL  5-10 mL IntraVENous Q8H    sodium chloride (NS) flush 5-10 mL  5-10 mL IntraVENous PRN    acetaminophen (TYLENOL) tablet 650 mg  650 mg Oral Q4H PRN    HYDROcodone-acetaminophen (NORCO) 5-325 mg per tablet 1 Tab  1 Tab Oral Q4H PRN    ondansetron (ZOFRAN) injection 4 mg  4 mg IntraVENous Q4H PRN    heparin (porcine) injection 5,000 Units  5,000 Units SubCUTAneous Q8H    albuterol-ipratropium (DUO-NEB) 2.5 MG-0.5 MG/3 ML  3 mL Nebulization Q4H PRN    NOREPINephrine (LEVOPHED) 4 mg in 5% dextrose 250 mL infusion  2-30 mcg/min IntraVENous TITRATE    propofol (DIPRIVAN) infusion  0-50 mcg/kg/min IntraVENous TITRATE       Exam:  Vitals:    04/09/18 1019 04/09/18 1109 04/09/18 1209 04/09/18 1309   BP:  127/63 130/73 130/77   Pulse: 72 77 78 92   Resp: 17 12 12 14   Temp:       SpO2: 95% 95% 95% 94%   Weight:       Height:             Intake/Output Summary (Last 24 hours) at 04/09/18 1410  Last data filed at 04/09/18 0603   Gross per 24 hour   Intake             1405 ml   Output              500 ml   Net              905 ml     PE:  GEN - ill appearing,in no distress, intubated, cachexia, bilateral temporal wasting  CV - regular, no murmur, no rub  Lung - clear bilaterally  Abd - soft, nontender  Ext - anasarca    Labs  Recent Labs      04/09/18 0445  04/08/18   0359  04/07/18   0336   WBC  10.2  13.2*  13.8*   HGB  11.1*  11.3*  11.4*   HCT  33.5*  34.5*  34.5*   PLT  151  171  185     Recent Labs      04/09/18   0445  04/08/18   0359  04/07/18   0336  04/06/18   1424   NA  144  144  144   --    K  3.9  4.1  4.1   --    CL  108*  108*  106   --    CO2  25  24  25   --    BUN  69*  59*  46*   --    CREA  2.16*  2.23*  1.90*   --    GLU  183*  149*  139*   --    CA  8.1*  7.3*  7.4*   --    MG   --    --   1.9  1.7*     Recent Labs      04/09/18   0240   PH  7.40   PCO2  37   PO2  112*       Problem List:  Patient Active Problem List    Diagnosis Date Noted    Volume overload 04/09/2018    Bacteremia 04/07/2018    Acute respiratory failure with hypoxia (HCC) 04/06/2018    Encephalopathy 04/06/2018    Pleural effusion 04/06/2018    Cardiac arrest ([facility] [address] 04/06/2018    Pneumonia of right lower lobe due to infectious organism ([facility] [address] 04/05/2018       Issues Addressed By Nephrology:    Plan:  1. MORENO  2. CAP  3. Resp Failure  4. COPD  5. Pulmonary Nodule  6. Cachexia  Supportive care  Lasix prn and today  ? ? prognosis

## 2018-04-09 NOTE — CONSULTS
Palliative Care    Patient: Rigoberto Arora MRN: [id number]  SSN: [id number]    YOB: 1940  Age: 69 y.o. Sex: male       Date of Request: 4/9/2018  Date of Consult:  4/9/2018  Reason for Consult:  family support and education and medical decision making  Requesting Physician: Dr. Juan Guerra     Assessment/Plan:     Principal Diagnosis:    Debility, Unspecified  R53.81-currently critically ill; expect pt to need STR if he is able to be extubated; will need to speak to [place], pt partner, regarding pt baseline. Additional Diagnoses:   · Acute Respiratory Failure, Unspecified  J96.00-remains ventilated; working toward weaning, extubation which is goal    · Altered Mental Status R41.82-no more    · Counseling, Encounter for Medical Advice  Z71.9-son has just left upon my arrival; per MD, son was going to look in pt house to see if there is advanced care planning documentation, HCPOA/Living Will; reportedly, son states that pt would not want lengthy aggressive measures like ventilator support/tube feeding. · Encounter for Palliative Care  Z51.5    Palliative Performance Scale (PPS):  PPS: 30    Medical Decision Making:   Reviewed and summarized H&P and notes since admission. Discussed case with appropriate providers: discussed with nursing staff and Dr. Jayesh Wilkinson. Reviewed laboratory and x-ray data: CT Chest, CBC, CMP, ABGs    We will continue to follow; will certainly assist with discussions regarding medical decisions. If pt is able to make decisions once extubated, would strongly recommend completion of HCPOA if this document is not found in the home. We will continue to provide support for pt and family. Will discuss findings with members of the interdisciplinary team.      Thank you for this referral.       .    Subjective:     History obtained from:  Care Provider and Chart    Chief Complaint: acute respiratory failure/pneumonia  History of Present Illness:   This is a 69year old male admitted 4/5/2018 after presenting to ED with symptoms of persistent cough, dyspnea, fatigue, n/v, and diarrhea for the past few weeks. CT chest was negative for PE, but revealed emphysema, bilateral infiltrates, bilateral pleural effusions, and a 7mm PILLO nodule and R hilar lymph node, cardiomegaly, and atherosclerotic vascular disease. Pt reported on presentation that he had not been to see a doctor in the past 5-6 years. Shortly after arrival to medical floor from ED, pt had a cardiac arrest.  Chest compressions were performed and pt was intubated. ROSC was quickly achieved, no meds given. Pt remains ventilated, he is off sedation and pressors. He opens eyes and tracks movement. He moves feet when asked to, but does not move hands. Pt has 2 sons, one of whom has visited this morning. Pt has been living for the past ? years with his partner Coleman Barrett. He has kept his home, but either he and Coleman Barrett stay in her home, or at his. Sons reportedly have been somewhat estranged and have not yet met pt partner. Sons are not sure if pt has HCPOA, but are going to check in pt home when they can gain access. They have agreed to allowing Coleman Barrett to continue to have medical updates regarding pt condition. Advance Directive: No       Code Status:  Full Code            Health Care Power of : No - Patient does not have a [address]. History reviewed. No pertinent past medical history. Past Surgical History:   Procedure Laterality Date    HX CATARACT REMOVAL      HX TONSILLECTOMY      HX VASECTOMY       History reviewed. No pertinent family history. Social History   Substance Use Topics    Smoking status: Former Smoker    Smokeless tobacco: Never Used    Alcohol use No     Prior to Admission medications    Medication Sig Start Date End Date Taking? Authorizing Provider   folic acid 117 mcg tablet Take 400 mcg by mouth daily.    Yes Phys Other, MD   cholecalciferol (VITAMIN D3) 1,000 unit cap Take  by mouth daily. Yes Phys Other, MD       Allergies   Allergen Reactions    Antihistamines - Alkylamine Hives    Imodium [Loperamide] Hives    Tetracycline Hives        Review of Systems:  Review of systems not obtained due to patient factors. Pt intubated     Objective:     Visit Vitals    /78    Pulse 72    Temp 96.3 °F (35.7 °C)    Resp 17    Ht 6' 1\" (1.854 m)    Wt 63.7 kg (140 lb 8 oz)    SpO2 95%    BMI 18.54 kg/m2        Physical Exam:    General:  Cooperative. Debilitated, intubated   Eyes:  Conjunctivae/corneas clear    Nose: Nares normal. Septum midline. Neck: Supple, symmetrical, trachea midline, no JVD   Lungs:   Diminished bilaterally, ventilated   Heart:  Regular rate and rhythm   Abdomen:   Soft, non-tender, non-distended   Extremities: Normal, atraumatic, no cyanosis; generalized edema to all extremities   Skin: Skin color, texture, turgor normal. No rash or lesions.    Neurologic: Following some commands/tracking with eyes   Psych: Opens eyes to voice      Assessment:     Hospital Problems  Never Reviewed          Codes Class Noted POA    Volume overload ICD-10-CM: E87.70  ICD-9-CM: 276.69  4/9/2018 Unknown        Bacteremia ICD-10-CM: R78.81  ICD-9-CM: 790.7  4/7/2018 Unknown        Acute respiratory failure with hypoxia ([facility] [address] ICD-10-CM: J96.01  ICD-9-CM: 518.81  4/6/2018 Unknown        Encephalopathy ICD-10-CM: G93.40  ICD-9-CM: 348.30  4/6/2018 Unknown        Pleural effusion ICD-10-CM: J90  ICD-9-CM: 511.9  4/6/2018 Unknown        Cardiac arrest [facility]) ICD-10-CM: I46.9  ICD-9-CM: 427.5  4/6/2018 Unknown        * (Principal)Pneumonia of right lower lobe due to infectious organism [facility]) ICD-10-CM: J18.1  ICD-9-CM: 486  4/5/2018 Unknown              Signed By: Sharon Stewart NP     April 9, 2018

## 2018-04-09 NOTE — PROGRESS NOTES
Problem: Nutrition Deficit  Goal: *Optimize nutritional status  Nutrition Follow Up:  Reason for initial assessment: Consult for TF management per nutritional support protocols per Dr. Candice Ahmadi. Malnutrition screening tool trigger for weight loss; unknown amount of weight loss and eating poorly r/t decreased appetite. Assessment:   FT in place:  Abdomen soft, non tender with positive bowel sounds. Date of Last BM: none documented. Pt remains ventilated r/t repsiratory distress; tube feeding initiated 4/07; pt tolerating  tube feeding of Nepro at 40 ml/hr; no residuals noted. Pertinent Medications:  Zosyn, Vanc  1 dose Lasix this am  Anthropometrics:Height: 6' 1\" (185.4 cm), Weight Source: Bed, Weight: 63.7 kg (140 lb 8 oz), Body mass index is 18.54 kg/(m^2). BMI class of Underweight for Older American Male. Pt currently at 76% IBW. Macronutrient needs:  EER:  8355-6641 kcal /day (28-32 kcal/kg BW)  EPR:  64-76 grams protein/day (1-1.2 grams/kg BW) GFR 32  Max CHO:  Max 255 grams/day (50% kcal)  Fluid:  1ml/kcal  Intake/Comparative standards: Current NPO status does not meet kcal or protein needs. Current tube feeding of Nepro at 40 ml/hr meets 97% estimated kcal needs and 101% estimated protein needs. Intervention:  Meals and snacks: NPO; advance per MD recommendations  EN:  Discontinue current tube feeding; Initiate TF of Jevity 1.2 at 60 ml/hr and water flush of 25 ml/hr. Tube feeding /water flushes to provide 1728 kcal/day (97% of needs), 80 grams protein/day (105% of needs), 244 grams CHO/day (does not exceed max CHO),  and ~ 1762 ml free water/day (100% of needs). Nutrition Supplement Therapy: Electrolyte replacement per nutritional support protocols are active on MAR. Labs: BMP daily,  Phos and Magnesium every MWF  Coordination of nutrition care:  Sindhu Servin, RN  Discharge nutrition plan:  Too soon to determine    Alejandro Ho, [address], [facility] [address] [place] [address], MPH  [phone]

## 2018-04-09 NOTE — PROGRESS NOTES
[facility] CARDIOLOGY PROGRESS NOTE           4/8/2018 6:54 PM    Admit Date: 4/5/2018      Subjective:     No o/e; opens eyes but doesn't follow commands. On vent; 50% FiO2. Off pressors    ROS:  Cardiovascular:  As noted above    Objective:      Vitals:    04/08/18 1609 04/08/18 1709 04/08/18 1921 04/08/18 1929   BP: 131/71 125/72  142/72   Pulse: 78 82 81 80   Resp: 15 15 15 15   Temp: 97.8 °F (36.6 °C)   97.3 °F (36.3 °C)   SpO2: 98% 98% 99% 98%   Weight:       Height:           Physical Exam:  General-Intubated  Neck- supple, no JVD  CV- regular rate and rhythm no MRG  Lung- dec at bases  Abd- soft, nontender, nondistended  Ext- 1+ edema bilaterally. Skin- warm and dry    Data Review:   Recent Labs      04/08/18   0359  04/07/18   0336  04/06/18   1424   04/06/18   0711   04/06/18   0041   NA  144  144   --    --    --    < >  144   K  4.1  4.1   --    --    --    < >  4.6   MG   --   1.9  1.7*   < >   --    --    --    BUN  59*  46*   --    --    --    < >  36*   CREA  2.23*  1.90*   --    --    --    < >  1.32   GLU  149*  139*   --    --    --    < >  140*   WBC  13.2*  13.8*   --    --    --    < >  13.8*   HGB  11.3*  11.4*   --    --    --    < >  12.3*   HCT  34.5*  34.5*   --    --    --    < >  38.2*   PLT  171  185   --    --    --    < >  231   TROIQ   --    --    --    --   11.49*   --   1.68*    < > = values in this interval not displayed. Assessment/Plan:     Principal Problem:    Pneumonia of right lower lobe due to infectious organism ([facility] [address] (4/5/2018)    Active Problems:    Acute respiratory failure with hypoxia ([facility] [address] (4/6/2018)      Encephalopathy (4/6/2018)      Pleural effusion (4/6/2018)      Cardiac arrest ([facility] [address] (4/6/2018)      Bacteremia (4/7/2018)      Cardiac arrest ([facility] [address] (5/6/0830)- uncertain etiology with no strips noted; ? PEA but per records, quick ROSC. Echo with moderately impaired left ventricular function.  Elevated troponin possibly demand related in the setting of code but cannot rule out underlying CAD. Based on neurological outcomes, will address further workup. LV dysfunction meds limited by hypotension and reassess with clinical course. Continue ASA. Cautious with fluids with LV dysfunction. On abx for PNA. Worsening renal fn; renal evaluating but possible etiologies pre-renal with hypotension/contrast induced. Further recommendations pending clinicial course.      Lesly Berkowitz MD  4/8/2018 6:54 PM

## 2018-04-09 NOTE — PROGRESS NOTES
Pt. On pressure support since 9am.  Pt. Became restless just before extubation and O2 Sat. Dropped to the 80's. Morphine 2 mg given. Will re-evaluate pt. In 30 min.

## 2018-04-09 NOTE — PROGRESS NOTES
Patient noted to have increased abd distention. No residual from TF. Dr. Shanda Alatorre notified, orders received to hold TF and get KUB.

## 2018-04-09 NOTE — PROGRESS NOTES
Care Daily Progress Note: 4/9/2018  Admission Date: 4/5/2018     The patient's chart is reviewed and the patient is discussed with the staff. Subjective:   78yo WM presented with cough, dyspnea, n/v/d. CT with mild infiltrates, R effusion. Admitted for CAP. Arrested shortly after arriving to floor with brief compressions, intubation, and ROSC. now opens eyes and tracks but not moving extremities    Current Facility-Administered Medications   Medication Dose Route Frequency    morphine injection 2 mg  2 mg IntraVENous Q3H PRN    white Petrolatum-Mineral Oil (AKWA TEARS) ophthalmic ointment   Both Eyes PRN    piperacillin-tazobactam (ZOSYN) 3.375 g in 0.9% sodium chloride (MBP/ADV) 100 mL  3.375 g IntraVENous Q8H    vancomycin (VANCOCIN) 1,000 mg in 0.9% sodium chloride (MBP/ADV) 250 mL  1 g IntraVENous Q24H    aspirin (ASA) suppository 300 mg  300 mg Rectal DAILY    pantoprazole (PROTONIX) 40 mg in sodium chloride 0.9% 10 mL injection  40 mg IntraVENous DAILY    sodium chloride (NS) flush 5-10 mL  5-10 mL IntraVENous Q8H    sodium chloride (NS) flush 5-10 mL  5-10 mL IntraVENous PRN    acetaminophen (TYLENOL) tablet 650 mg  650 mg Oral Q4H PRN    HYDROcodone-acetaminophen (NORCO) 5-325 mg per tablet 1 Tab  1 Tab Oral Q4H PRN    ondansetron (ZOFRAN) injection 4 mg  4 mg IntraVENous Q4H PRN    heparin (porcine) injection 5,000 Units  5,000 Units SubCUTAneous Q8H    methylPREDNISolone (PF) (SOLU-MEDROL) injection 40 mg  40 mg IntraVENous Q12H    albuterol-ipratropium (DUO-NEB) 2.5 MG-0.5 MG/3 ML  3 mL Nebulization Q4H PRN    NOREPINephrine (LEVOPHED) 4 mg in 5% dextrose 250 mL infusion  2-30 mcg/min IntraVENous TITRATE    propofol (DIPRIVAN) infusion  0-50 mcg/kg/min IntraVENous TITRATE       Review of Systems   Unobtainable due to patient status.           Objective:     Vitals:    04/09/18 0509 04/09/18 0609 04/09/18 0709 04/09/18 0733   BP: 117/65 136/77 143/78    Pulse: 65 68 84 68 Resp: 15 15 14 15   Temp:   96.3 °F (35.7 °C)    SpO2: 98% (!) 88% 97% 97%   Weight:       Height:           Intake and Output:   04/07 1901 - 04/09 0700  In: 3855.3 [I.V.:2878.3]  Out: 36 [Urine:820]       Physical Exam:          Constitutional:  intubated and mechanically ventilated. EENMT:  Sclera clear, pupils equal, oral mucosa moist  Respiratory:  Clear with decreased breath sounds  Cardiovascular:  RRR with no M,G,R;  Gastrointestinal:  soft with no tenderness; positive bowel sounds present  Musculoskeletal:  warm with no cyanosis, 1+ lower leg edema  Skin:  no jaundice or ecchymosis  Neurologic: no gross neuro deficits     Psychiatric:  alert and tracks     LINES:  ETT, central line    DRIPS:  none    CXR:        Ventilator Settings  Mode FIO2 Rate Tidal Volume Pressure PEEP   SIMV, PRVC  40 %    520 ml  12 cm H2O  8 cm H20      Peak airway pressure: 30 cm H2O   Minute ventilation: 7.8 l/min     ABG:   Recent Labs      04/09/18   0240  04/08/18   0205  04/07/18   0310   PH  7.40  7.38  7.41   PCO2  37  33*  34*   PO2  112*  87  88   HCO3  23  19*  21*        LAB  No results for input(s): GLUCPOC in the last 72 hours.     No lab exists for component: [place]  Recent Labs      04/09/18   0445  04/08/18   0359  04/07/18   0336   WBC  10.2  13.2*  13.8*   HGB  11.1*  11.3*  11.4*   HCT  33.5*  34.5*  34.5*   PLT  151  171  185     Recent Labs      04/09/18   0445  04/08/18   0359  04/07/18   0336  04/06/18   1424  04/06/18   0900   NA  144  144  144   --    --    K  3.9  4.1  4.1   --    --    CL  108*  108*  106   --    --    CO2  25  24  25   --    --    GLU  183*  149*  139*   --    --    BUN  69*  59*  46*   --    --    CREA  2.16*  2.23*  1.90*   --    --    MG   --    --   1.9  1.7*  1.3*   CA  8.1*  7.3*  7.4*   --    --      Recent Labs      04/06/18   1424  04/06/18   1005   LAC  1.6  2.7*         Assessment:  (Medical Decision Making)     Hospital Problems  Never Reviewed          Codes Class Noted POA    Bacteremia ICD-10-CM: R78.81  ICD-9-CM: 790.7  4/7/2018 Unknown    1 bottle-strep mutans    Acute respiratory failure with hypoxia (HCC) ICD-10-CM: J96.01  ICD-9-CM: 518.81  4/6/2018 Unknown    On vent support    Encephalopathy ICD-10-CM: G93.40  ICD-9-CM: 348.30  4/6/2018 Unknown    Some better    Pleural effusion ICD-10-CM: J90  ICD-9-CM: 511.9  4/6/2018 Unknown    smalll    Cardiac arrest [facility]) ICD-10-CM: I46.9  ICD-9-CM: 427.5  4/6/2018 Unknown        * (Principal)Pneumonia of right lower lobe due to infectious organism [facility]) ICD-10-CM: J18.1  ICD-9-CM: 486  4/5/2018 Unknown                Volume overload    Plan:  (Medical Decision Making)   1   Try pressure support  2    vanc and zosyn  3    Lasix  4   Check albumin  --    More than 50% of the time documented was spent in face-to-face contact with the patient and in the care of the patient on the floor/unit where the patient is located.     Butch Marrero MD

## 2018-04-09 NOTE — PROGRESS NOTES
Hospitalist Progress Note    2018  Admit Date: 2018  5:31 PM   NAME: Hung Morrison   :  1940   MRN:  [id number]   Attending: Yris Pemberton MD  PCP:  [facility] On File [facility]    SUBJECTIVE:     Hung Morrison is a 70 yo male with unremarkable medical history admitted initially with resp distress, PNA and new left pulm nodule and hilar lymph node on CT scan. Patient has cardiopulmonary arrest immediately on medical floor and required intubation. ROSC was achieved quickly and no medications used. Anesthesiology placed central and arterial line and patient transported to ICU on vent with sedation. Pulmonary consulted for vent management. Today: Remains Intubated off sedation and pressors. Now on pressure support and tolerating. He opens eyes spontaneously and with commands but not tracking. Follows simple commands but no spontaneous limb movement. Urine output improved overnight        PHYSICAL EXAM       Visit Vitals    /78    Pulse 74    Temp 96.3 °F (35.7 °C)    Resp 12    Ht 6' 1\" (1.854 m)    Wt 63.7 kg (140 lb 8 oz)    SpO2 95%    BMI 18.54 kg/m2      Temp (24hrs), Av.5 °F (36.4 °C), Min:96.3 °F (35.7 °C), Max:98.1 °F (36.7 °C)    Oxygen Therapy  O2 Sat (%): 95 % (18)  Pulse via Oximetry: 67 beats per minute (18 0709)  O2 Device: Endotracheal tube;Ventilator (18)  FIO2 (%): 35 % (18)  ETCO2 (mmHg): 96 mmHg (18 0842)    Intake/Output Summary (Last 24 hours) at 18  Last data filed at 18 06   Gross per 24 hour   Intake             1405 ml   Output              620 ml   Net              785 ml          General: Intubated on vent. Frail and cachetic  Head:  Atraumatic Normocephalic. Eyes:  Pupils L>R and reactive bilat  ENT:  No discharges/lesions. Lungs:  CTA Bilaterally. CVS:  Regular rate and rhythm,  No murmur, rub, or gallop, No JVD, No lower extremity edema.   Abdomen: Soft, Non distended, Positive bowel sounds. MSK:  No deformities, lesions,   Neurologic:  Spontaneously opens eyes. Wiggles toes and squeeze hands on command. No spontaneous limb movement  Skin:   No rash      Recent Results (from the past 24 hour(s))   BLOOD GAS, ARTERIAL    Collection Time: 04/09/18  2:40 AM   Result Value Ref Range    pH 7.40 7.35 - 7.45      PCO2 37 35 - 45 mmHg    PO2 112 (H) 80 - 105 mmHg    BICARBONATE 23 22 - 26 mmol/L    BASE DEFICIT 1.6 0 - 2 mmol/L    SITE RR     ALLENS TEST POSITIVE      MODE SIMV PRVC     Tidal volume 520.0      RATE 15.0      PEEP/CPAP 8.0      Respiratory comment: Pippa RN at 4 9 2018 2 45 17 AM. Read back. PLEASE READ & DOCUMENT PPD TEST IN 72 HRS    Collection Time: 04/09/18  3:00 AM   Result Value Ref Range    PPD Negative Negative    mm Induration 0 mm   CBC WITH AUTOMATED DIFF    Collection Time: 04/09/18  4:45 AM   Result Value Ref Range    WBC 10.2 4.3 - 11.1 K/uL    RBC 3.48 (L) 4.23 - 5.67 M/uL    HGB 11.1 (L) 13.6 - 17.2 g/dL    HCT 33.5 (L) 41.1 - 50.3 %    MCV 96.3 79.6 - 97.8 FL    MCH 31.9 26.1 - 32.9 PG    MCHC 33.1 31.4 - 35.0 g/dL    RDW 13.9 11.9 - 14.6 %    PLATELET 810 870 - 701 K/uL    MPV 10.9 10.8 - 14.1 FL    DF AUTOMATED      NEUTROPHILS 95 (H) 43 - 78 %    LYMPHOCYTES 1 (L) 13 - 44 %    MONOCYTES 4 4.0 - 12.0 %    EOSINOPHILS 0 (L) 0.5 - 7.8 %    BASOPHILS 0 0.0 - 2.0 %    IMMATURE GRANULOCYTES 0 0.0 - 5.0 %    ABS. NEUTROPHILS 9.6 (H) 1.7 - 8.2 K/UL    ABS. LYMPHOCYTES 0.1 (L) 0.5 - 4.6 K/UL    ABS. MONOCYTES 0.4 0.1 - 1.3 K/UL    ABS. EOSINOPHILS 0.0 0.0 - 0.8 K/UL    ABS. BASOPHILS 0.0 0.0 - 0.2 K/UL    ABS. IMM.  GRANS. 0.0 0.0 - 0.5 K/UL   METABOLIC PANEL, BASIC    Collection Time: 04/09/18  4:45 AM   Result Value Ref Range    Sodium 144 136 - 145 mmol/L    Potassium 3.9 3.5 - 5.1 mmol/L    Chloride 108 (H) 98 - 107 mmol/L    CO2 25 21 - 32 mmol/L    Anion gap 11 7 - 16 mmol/L    Glucose 183 (H) 65 - 100 mg/dL    BUN 69 (H) 8 - 23 MG/DL    Creatinine 2.16 (H) 0.8 - 1.5 MG/DL    GFR est AA 38 (L) >60 ml/min/1.73m2    GFR est non-AA 32 (L) >60 ml/min/1.73m2    Calcium 8.1 (L) 8.3 - 10.4 MG/DL         Imaging /Procedures /Studies   CT Chest IMPRESSION  Impression:   1. No evidence of PE.  2. Emphysema. Right greater than left infiltrates and pleural effusions. 3. Indeterminate left upper lobe nodule and right hilar lymph node. 4. Cardiomegaly, atherosclerotic vascular disease. Echocardiogram SUMMARY:  -  Left ventricle: Systolic function was moderately reduced. Ejection   Fraction was estimated in the range of 35 % to 45 %. -  Right ventricle: The ventricle was markedly dilated. Systolic function was  markedly reduced. -  Aortic valve: Aortic valve is thickened and calcified with possible mild  stenosis (difficult visualization) but does not appear to be severe. There   Was mild regurgitation. ASSESSMENT      Hospital Problems as of 4/9/2018  Never Reviewed          Codes Class Noted - Resolved POA    Volume overload ICD-10-CM: E87.70  ICD-9-CM: 276.69  4/9/2018 - Present Unknown        Bacteremia ICD-10-CM: R78.81  ICD-9-CM: 790.7  4/7/2018 - Present Unknown        Acute respiratory failure with hypoxia (HCC) ICD-10-CM: J96.01  ICD-9-CM: 518.81  4/6/2018 - Present Unknown        Encephalopathy ICD-10-CM: G93.40  ICD-9-CM: 348.30  4/6/2018 - Present Unknown        Pleural effusion ICD-10-CM: J90  ICD-9-CM: 511.9  4/6/2018 - Present Unknown        Cardiac arrest [facility]) ICD-10-CM: I46.9  ICD-9-CM: 427.5  4/6/2018 - Present Unknown        * (Principal)Pneumonia of right lower lobe due to infectious organism [facility]) ICD-10-CM: J18.1  ICD-9-CM: 486  4/5/2018 - Present Unknown            Cardioplumonary arrest:  Troponin elevated post resuscitation. Echo shows reduced EF of 35-45%. Cardiology consulted and holding further workup until more stable. Input appreciated    Acute Hypoxic Resp Failure: Continue Vent and wean as tolerated.  Pulm managing and input appreciated    Pneumonia:  Cont Zosyn and continue Vancomycin. Final cultures pending    Acute Tubular Necrosis/Oliguria: increased urine output overnight. Nephrology consulted and input appreciated. Creatinine improving    Possible Anoxic Brain injury: EEG pending. Neurology and Palliative consulted    Systolic Congestive Heart Failure:EF of 35-45% . IVF discontinued yesterday. Lasix 40mg x1 this am    Hypotension: Pressors currently off. Titrate pressors to maintain MAP >65     Emphysema: seen on CT scan. No prior diagnosis of COPD. Chronic smoker. Cont on solumedrol, duonebs and IV abx.      General symptoms of fatigue and N/V/D: Will cont supportive management and monitor.      Bilateral small pleural effusions. 7mm left upper lobe nodule and right hilar lymph node 1.6 x 1.3 cm: Pulmonary input appreciated. Will need further workup for malignancy     Cachexia and malnutrition: Continue NGT and start tube feeds.     Chronic former smoker: Reports quit ~ 5 years ago.      DVT ppx:  SQH.    Code status:  Full  Risk:  high      Belgica Jung MD

## 2018-04-10 NOTE — PROGRESS NOTES
Bedside shift change report given to Kevin Kilgore RN (oncoming nurse) by Arcelia Aguilera RN (offgoing nurse). Report included the following information SBAR, Kardex, ED Summary, Intake/Output, MAR, Recent Results and Cardiac Rhythm NSR     EEG at bedside, unable to turn and assess skin at this time.

## 2018-04-10 NOTE — PROGRESS NOTES
Ventilator check complete; patient has a #8. 0 ET tube secured at the 25 at the lip. Patient is not sedated. Patient is not able to follow commands. Breath sounds are clear. Trachea is midline, Negative for subcutaneous air, and chest excursion is symmetric. Patient is also Negative for cyanosis and is Negative for pitting edema. All alarms are set and audible. Resuscitation bag is at the head of the bed.       Ventilator Settings  Mode FIO2 Rate Tidal Volume Pressure PEEP I:E Ratio   CPAP  40 %    0 ml  0 cm H2O  8 cm H20  0      Peak airway pressure: 11 cm H2O   Minute ventilation: 4.9 l/min     ABG:   Recent Labs      04/09/18   0240  04/08/18   0205  04/07/18   0310   PH  7.40  7.38  7.41   PCO2  37  33*  34*   PO2  112*  87  88   HCO3  23  19*  21*         Indra Mccann, RT

## 2018-04-10 NOTE — PROGRESS NOTES
Ventilator check complete; patient has a #8. 0 ET tube secured at the 25 at the lip. Patient is not sedated. Patient is not able to follow commands. Breath sounds are coarse. Trachea is midline, Negative for subcutaneous air, and chest excursion is symmetric. Patient is also Negative for cyanosis and is Negative for pitting edema. All alarms are set and audible. Resuscitation bag is at the head of the bed.       Ventilator Settings  Mode FIO2 Rate Tidal Volume Pressure PEEP I:E Ratio   CPAP  40 %    0 ml  0 cm H2O  8 cm H20  0      Peak airway pressure: 11 cm H2O   Minute ventilation: 4.9 l/min     ABG:   Recent Labs      04/10/18   0500  04/09/18   0240  04/08/18   0205   PH  7.33*  7.40  7.38   PCO2  51*  37  33*   PO2  81  112*  87   HCO3  26  23  19*         Indra Mccann, RT

## 2018-04-10 NOTE — PROCEDURES
[facility]  EEG    Radha Simon  MR#: [id number]  : 1940  ACCOUNT #: [de-identified]   DATE OF SERVICE: 04/10/2018    DESCRIPTION:  This is a 21 channel EEG with 2 central leads and an EKG strip performed on this patient to rule out seizure activity. The patient's EEG does show extremely severe slowing graded at 3-4 Hz activity that is disorganized throughout the EEG. There is no rhythmic activity. No subclinical status, seizure activity and no spike and wave or sharp wave activity. Hyperventilation could not be performed. Photic stimulation was performed and did not elicit a change in the background rhythm. The patient did not enter into stage I or stage II sleep. IMPRESSION:  This is an abnormal EEG consistent with a severe encephalopathic process and may be of a toxic metabolic or degenerative etiology, but there is no subclinical seizure activity or spike and wave activity.       DO Latasha Borrego  D: 04/10/2018 09:52     T: 04/10/2018 10:20  JOB #: [id number]

## 2018-04-10 NOTE — PROGRESS NOTES
Patient with hypoxia post Xopenex breathing tx. Patient with very weak cough and unable to clear secretions. NT suctioning performed, large amt thick white sputum obtained from deep in airway. SpO2 up to 95% post NT suctioning.

## 2018-04-10 NOTE — PROGRESS NOTES
Palliative Care Progress Note    Patient: Romaine Hartmann MRN: [id number]  SSN: [id number]    YOB: 1940  Age: 69 y.o. Sex: male       Assessment/Plan:     Chief Complaint/Interval History: Encephalopathic. Extubation earlier this morning. Principal Diagnosis:    · Encephalopathy, Unspecified  G93.40    Additional Diagnoses:   · Altered Mental Status R41.82  · Cachexia  R64  · Failure to Thrive  R62.7  · Counseling, Encounter for Medical Advice  Z71.9  · Encounter for Palliative Care  Z51.5    Palliative Performance Scale (PPS)  PPS: 30    Medical Decision Making:   Reviewed and summarized notes over previous 24 hours. Discussed case with appropriate providers: Dr. Moises Castle; [address], [facility] RN  Reviewed laboratory and x-ray data. Patient resting in bed, extubated earlier this morning. He is lethargic, but opens eyes to voice. He weakly squeezes my hand on command, and denies pain and dyspnea through nodding. No other command following. Called and spoke to patient's son, Neisha Mckee, who lives 2 hours away. Updated him on patient's condition. Neisha Mckee with excellent understanding of patient current condition, as well a his chronic debility/failure to thrive. Neisha Mckee reports patient's smoking history, his avoidance of physicians, and private nature. He and his brother have spoken this morning, and they understand that patient is at high risk of decompensating or at least not returning to baseline. They desire re intubation if needed, but not long term ventilatory support. Neisha Mckee understands possible outcomes, including not surviving this illness to need for long term care. He is appreciative of update, and would like to be made aware of any changes, and states he could come any time if needed. Will continue to follow.     Will discuss findings with members of the interdisciplinary team.         More than 50% of this 35 minute visit was spent counseling and coordination of care as outlined above.    Subjective:     Review of Systems:  Review of systems not obtained due to patient factors: encephalopathic, denies pain and dyspnea     Objective:     Visit Vitals    /58    Pulse 86    Temp 98.3 °F (36.8 °C)    Resp 15    Ht 6' 1\" (1.854 m)    Wt 64.9 kg (143 lb)    SpO2 98%    BMI 18.87 kg/m2       Physical Exam:    General:  Frail, cachectic, male. Lethargic. Cooperative. No acute distress. Eyes:  Conjunctivae/corneas clear. Nose: Nares normal. Septum midline. O2 via Optiflow. Neck: Supple, symmetrical, trachea midline. Lungs:   Diminished bilaterally, unlabored   Heart:  Regular rate and rhythm. Abdomen:   Soft, non-tender, non-distended. Extremities: Normal, atraumatic, no cyanosis. 1+ pitting pedal edema. Skin: Skin color, texture, turgor normal. No rash. Neurologic: Lethargic. Psych: Unable to assess.      Signed By: Demetrice Chawla NP     April 10, 2018

## 2018-04-10 NOTE — PROGRESS NOTES
Patient's SpO2 now 86%, after approximately 45 minutes from last NT suctioning. NT suctioned again, AriVo increased to 40 L/60%, SpO2 now 96%. Will closely monitor.

## 2018-04-10 NOTE — PROGRESS NOTES
Patient extubated to Airvo at 40 L and 37% Fio2 . Patient is able to communicate and is negative for stridor. Breath sounds are diminished b/l. No complications with extubation.      Raimundo Hernandes, RT

## 2018-04-10 NOTE — PROGRESS NOTES
Patient extubated to AirVo 40 L/37%. No stridor noted. Expressive aphasia, no verbal response at this time. Very weak cough requiring deep suctioning with yankauer. SpO2 94-96%. No change to breath sounds post extubation, remains very diminished throughout.

## 2018-04-10 NOTE — PROGRESS NOTES
Problem: Nutrition Deficit  Goal: *Optimize nutritional status  Nutrition Follow Up:  Reason for initial assessment: Consult for TF management per nutritional support protocols per Dr. Laquita Mann. Malnutrition screening tool trigger for weight loss; unknown amount of weight loss and eating poorly r/t decreased appetite. Assessment:   FT in place:  Abdomen slightly firm with positive bowel sounds. Date of Last BM: 4/09  Pt extubated this am; non verbal.  Tube feeding initiated 4/07; pt tolerating  tube feeding of Jevity 1.2 at 60 ml/hr; no residuals noted. Extremities:  No edema bilaterally  Pertinent Medications:  Zosyn, 1 dose Lasix this am, SoluMedrol  Pertinent Rx:  Hypernatremia-147, blood sugar 178-on solumedrol, Hyperphosphatemia 3.9, Hypomagnesemia 1.7-bolus per electrolyte replacement protocols. Anthropometrics:Height: 6' 1\" (185.4 cm), Weight Source: Bed, Weight: 63.7 kg (140 lb 8 oz), Body mass index is 18.54 kg/(m^2). BMI class of Underweight for Older American Male. Pt currently at 76% IBW. Weight:  4/10: 64.9 kg-bed weight  Macronutrient needs:  EER:  6217-7092 kcal /day (28-32 kcal/kg BW)  EPR:  64-76 grams protein/day (1-1.2 grams/kg BW) GFR 32  Max CHO:  Max 255 grams/day (50% kcal)  Fluid:  1ml/kcal  Intake/Comparative standards: Current NPO status does not meet kcal or protein needs. Current tube feeding meets 97% estimated kcal needs and 105% estimated protein needs. Intervention:  Meals and snacks: NPO; advance per MD recommendations  EN:  Continue TF of Jevity 1.2 at 60 ml/hr and water flush of 25 ml/hr. Tube feeding /water flushes to provide 1728 kcal/day (97% of needs), 80 grams protein/day (105% of needs), 244 grams CHO/day (does not exceed max CHO),  and ~ 1762 ml free water/day (100% of needs). Nutrition Supplement Therapy: Electrolyte replacement per nutritional support protocols are active on MAR.   Labs: BMP daily,  Phos and Magnesium every MWF  Coordination of nutrition care: Tung Russell RN  Discharge nutrition plan:  Too soon to determine     Demetrius Ochoa, RD, LD, MPH  [phone]  Harry Almanzar

## 2018-04-10 NOTE — PROGRESS NOTES
Hospitalist Progress Note    4/10/2018  Admit Date: 2018  5:31 PM   NAME: Nilsa Escobar   :  1940   MRN:  [id number]   Attending: Milagro Page MD  PCP:  Not On File [facility]    SUBJECTIVE:     Nilsa Escobar is a 68 yo male with unremarkable medical history admitted initially with resp distress, PNA and new left pulm nodule and hilar lymph node on CT scan. Patient has cardiopulmonary arrest immediately on medical floor and required intubation. ROSC was achieved quickly and no medications used. Anesthesiology placed central and arterial line and patient transported to ICU on vent with sedation. Pulmonary consulted for vent management. 4/10 - Patient remains intubated at time of exam, with plans per Pulmonology to attempt extubation today. Patient opens his eyes on verbal command, but does not follow my requests otherwise. Per nursing staff, on evaluation by Dr. Yennifer Capellan this morning, patient did follow his commands. PHYSICAL EXAM       Visit Vitals    /63    Pulse 75    Temp 98.3 °F (36.8 °C)    Resp 12    Ht 6' 1\" (1.854 m)    Wt 64.9 kg (143 lb)    SpO2 98%    BMI 18.87 kg/m2      Temp (24hrs), Av.9 °F (36.6 °C), Min:97.6 °F (36.4 °C), Max:98.3 °F (36.8 °C)    Oxygen Therapy  O2 Sat (%): 98 % (04/10/18 0809)  Pulse via Oximetry: 79 beats per minute (04/10/18 0809)  O2 Device: Endotracheal tube (04/10/18 0309)  FIO2 (%): 40 % (04/10/18 0745)  ETCO2 (mmHg): 96 mmHg (18 0842)    Intake/Output Summary (Last 24 hours) at 04/10/18 0859  Last data filed at 04/10/18 0520   Gross per 24 hour   Intake             2346 ml   Output             2775 ml   Net             -429 ml          General: Intubated on vent. Frail and cachetic  Head:  Atraumatic Normocephalic. Eyes:  Pupils reactive bilaterally  ENT:  No discharges/lesions. Lungs:  CTA Bilaterally. CVS:  Regular rate and rhythm, no murmur, rub, or gallop, no JVD, No lower extremity edema.   Abdomen: Thin, Slightly firm with mild abdominal wall edema, Positive bowel sounds. :  Scrotal and penile edema, no erythema, beebe in place  Neurologic:  Spontaneously opens eyes.   Does not respond to my commands (unclear if volitional) No spontaneous limb movement during examination  Skin:   No rash      Recent Results (from the past 24 hour(s))   VANCOMYCIN, TROUGH    Collection Time: 04/09/18 10:39 PM   Result Value Ref Range    Vancomycin,trough 26.6 (HH) 5 - 20 ug/mL   METABOLIC PANEL, BASIC    Collection Time: 04/10/18  4:32 AM   Result Value Ref Range    Sodium 147 (H) 136 - 145 mmol/L    Potassium 3.8 3.5 - 5.1 mmol/L    Chloride 109 (H) 98 - 107 mmol/L    CO2 29 21 - 32 mmol/L    Anion gap 9 7 - 16 mmol/L    Glucose 178 (H) 65 - 100 mg/dL    BUN 74 (H) 8 - 23 MG/DL    Creatinine 2.14 (H) 0.8 - 1.5 MG/DL    GFR est AA 39 (L) >60 ml/min/1.73m2    GFR est non-AA 32 (L) >60 ml/min/1.73m2    Calcium 7.9 (L) 8.3 - 10.4 MG/DL   CBC W/O DIFF    Collection Time: 04/10/18  4:32 AM   Result Value Ref Range    WBC 14.1 (H) 4.3 - 11.1 K/uL    RBC 3.64 (L) 4.23 - 5.67 M/uL    HGB 11.5 (L) 13.6 - 17.2 g/dL    HCT 35.4 (L) 41.1 - 50.3 %    MCV 97.3 79.6 - 97.8 FL    MCH 31.6 26.1 - 32.9 PG    MCHC 32.5 31.4 - 35.0 g/dL    RDW 14.1 11.9 - 14.6 %    PLATELET 636 859 - 017 K/uL    MPV 11.1 10.8 - 14.1 FL   MAGNESIUM    Collection Time: 04/10/18  4:32 AM   Result Value Ref Range    Magnesium 1.7 (L) 1.8 - 2.4 mg/dL   PHOSPHORUS    Collection Time: 04/10/18  4:32 AM   Result Value Ref Range    Phosphorus 3.9 (H) 2.3 - 3.7 MG/DL   BLOOD GAS, ARTERIAL    Collection Time: 04/10/18  5:00 AM   Result Value Ref Range    pH 7.33 (L) 7.35 - 7.45      PCO2 51 (H) 35 - 45 mmHg    PO2 81 80 - 105 mmHg    BICARBONATE 26 22 - 26 mmol/L    BASE DEFICIT 0.3 0 - 2 mmol/L    SITE RR     ALLENS TEST POSITIVE      MODE CPAP     Tidal volume 408.0      RATE 13.0     BLOOD GAS, ARTERIAL    Collection Time: 04/10/18  8:50 AM   Result Value Ref Range    pH 7.36 7.35 - 7.45 PCO2 49 (H) 35 - 45 mmHg    PO2 79 (L) 80 - 105 mmHg    BICARBONATE 27 (H) 22 - 26 mmol/L    BASE EXCESS 0.7 0 - 3 mmol/L    SITE LR     ALLENS TEST NA     MODE CPAP     Respiratory comment:  at 4 10 2018 8 55 23 AM. Read back. Imaging /Procedures /Studies   CT Chest IMPRESSION  Impression:   1. No evidence of PE.  2. Emphysema. Right greater than left infiltrates and pleural effusions. 3. Indeterminate left upper lobe nodule and right hilar lymph node. 4. Cardiomegaly, atherosclerotic vascular disease. Echocardiogram SUMMARY:  -  Left ventricle: Systolic function was moderately reduced. Ejection   Fraction was estimated in the range of 35 % to 45 %. -  Right ventricle: The ventricle was markedly dilated. Systolic function was  markedly reduced. -  Aortic valve: Aortic valve is thickened and calcified with possible mild  stenosis (difficult visualization) but does not appear to be severe. There   Was mild regurgitation. ASSESSMENT      Hospital Problems as of 4/10/2018  Never Reviewed          Codes Class Noted - Resolved POA    Volume overload ICD-10-CM: E87.70  ICD-9-CM: 276.69  4/9/2018 - Present Unknown        Bacteremia ICD-10-CM: R78.81  ICD-9-CM: 790.7  4/7/2018 - Present Unknown        Acute respiratory failure with hypoxia (HCC) ICD-10-CM: J96.01  ICD-9-CM: 518.81  4/6/2018 - Present Unknown        Encephalopathy ICD-10-CM: G93.40  ICD-9-CM: 348.30  4/6/2018 - Present Unknown        Pleural effusion ICD-10-CM: J90  ICD-9-CM: 511.9  4/6/2018 - Present Unknown        Cardiac arrest [facility]) ICD-10-CM: I46.9  ICD-9-CM: 427.5  4/6/2018 - Present Unknown        * (Principal)Pneumonia of right lower lobe due to infectious organism [facility]) ICD-10-CM: J18.1  ICD-9-CM: 486  4/5/2018 - Present Unknown            Cardioplumonary arrest:  Troponin elevated post resuscitation. Echo shows reduced EF of 35-45%. Cardiology consulted and holding further workup until more stable.  Appreciate their input and recommendations    Acute Hypoxic Resp Failure: Plan for attempt at extubation today. Pulm managing and input appreciated    Pneumonia: Currently on vanc/zosyn. Would likely be able to discontinue vanc today as no MRSA has been isolated on cultures    Acute Tubular Necrosis/Oliguria:  Total 24hr output was 382. Nephrology consulted and input appreciated. Creatinine stable at 2.14. Possible Anoxic Brain injury: EEG pending. Neurology and Palliative consulted    Systolic Congestive Heart Failure:EF of 35-45% . IVF discontinued yesterday. Will dose Lasix 40mg again this am    Hypotension: Pressors currently off. Titrate pressors to maintain MAP >65     Emphysema: seen on CT scan. No prior diagnosis of COPD. Chronic smoker. Cont on solumedrol, duonebs and IV abx.      General symptoms of fatigue and N/V/D: Will cont supportive management and monitor.      Bilateral small pleural effusions. 7mm left upper lobe nodule and right hilar lymph node 1.6 x 1.3 cm: Pulmonary input appreciated. Will need further workup for malignancy     Cachexia and malnutrition: Continue NGT and start tube feeds.     Chronic former smoker: Reports quit ~ 5 years ago.      DVT ppx:  SQH.    Code status:  Full  Risk:  high      Parag Yeh MD

## 2018-04-10 NOTE — PROGRESS NOTES
Patient transported down with monitor and RN for ordered MRI. Patient placed on table, patient began to get very agitated despite reassurance. Unable to keep head still for MRI ordered by neurologist. Dr. Levin Boxer called, ok to try Haldol IM but schedule warrants time in ICU to see how patient will react to IM Haldol. Per Dr. Levin Boxer ok to get patient calm for MRI tomorrow.

## 2018-04-10 NOTE — PROGRESS NOTES
Patient having trouble clearing secretions. NT suctioned large amounts of white thick secretions with Mary Valle RN. Patient tolerated well.

## 2018-04-10 NOTE — PROGRESS NOTES
Patient open eyes when name called. No other commands follow. Head of bed elevated. Respiration even and unlabored. Oxygen saturation 95 % on heated high flow nasal cannula. No residual from tube feeding. Sinus rhythm on cardiac monitor. Heart rate 91. Lower extremities warm to touch. Positive doppler signals. Hands nail beds are blue and cool to touch. Allevyn peeled back. Sacral/coccyx blanchable. Ecchymosis to extremities. Bed in low/lock position. Bed alarm on.

## 2018-04-10 NOTE — PROGRESS NOTES
Shift assessment completed, chart reviewed. Patient is orally intubated, awakens to verbal stimuli, no command following, will focus with eyes but not track. Pupils are 3 mm, sluggish to react. NSR on monitor, BP stable. Breath sounds are very diminished, clear, vent settings per RT, FiO2 40%. NGT to Right nare, tolerating tube feedings well, 5 ml of tan gastric residual. Abdomen is distended, semi-soft, very edematous, bowel sounds active in all 4 quadrants. Queen cath in place, draining yellow urine with sediment. Withdraws from all extremities, +2 pitting edema to BUE, +3 pitting edema to BLE, +3 non-pitting to scrotum/penis. No s/s of pain at this time. VSS. NAD.

## 2018-04-10 NOTE — PROGRESS NOTES
[facility]        Subjective: Rakesh   Pt is nonverbal     Review of Systems -   Can not be obtained due to pt's condition. Objective:    Vitals:    04/10/18 1009 04/10/18 1109 04/10/18 1209 04/10/18 1309   BP: 95/56 104/60 111/62 126/79   Pulse: 74 94 78 100   Resp: (!) 33 26 13 22   Temp:       SpO2: 98% 97% 96% (!) 89%   Weight:       Height:           PE  Gen: in no acute distress  CV:reg rate  Chest:clear  Abd: soft  Ext/Access: 2+ edema       . LAB  Recent Labs      04/10/18   0432  04/09/18   0445  04/08/18   0359   WBC  14.1*  10.2  13.2*   HGB  11.5*  11.1*  11.3*   HCT  35.4*  33.5*  34.5*   PLT  161  151  171     Recent Labs      04/10/18   0432  04/09/18   0445  04/08/18   0359   NA  147*  144  144   K  3.8  3.9  4.1   CL  109*  108*  108*   CO2  29  25  24   GLU  178*  183*  149*   BUN  74*  69*  59*   CREA  2.14*  2.16*  2.23*   MG  1.7*   --    --    PHOS  3.9*   --    --    CA  7.9*  8.1*  7.3*   ALB   --   2.3*   --            Radiology    A/P:   Patient Active Problem List   Diagnosis Code    Pneumonia of right lower lobe due to infectious organism ([facility]) J18.1    Acute respiratory failure with hypoxia ([facility]) J96.01    Encephalopathy G93.40    Pleural effusion J90    Cardiac arrest ([facility]) I46.9    Bacteremia R78.81    Volume overload E87.70       RAKESH - renal function stable  Will check labs in am.   Volume overload - on lasix  Encephalopaty -       Nehal Clarke MD

## 2018-04-10 NOTE — PROGRESS NOTES
MRI consent completed via telephone to the best of his ability with Tete Rodas, Son. 2 RN telephone consent obtained to complete MRI. MRI tech notified.

## 2018-04-10 NOTE — PROGRESS NOTES
Requested MRI consent completion. Patient unable to consent. RN to contact family and will let us know when completed.

## 2018-04-10 NOTE — PROGRESS NOTES
[facility] CARDIOLOGY PROGRESS NOTE           4/10/2018 11:50 AM    Admit Date: 4/5/2018      Subjective:   He was extubated today and is semiconscious - appears to hear voice but is nonverbal.   He has moderate reduction in LV function and RVE - CTA showed no PTE>   Post cardiac arrest.     Echo:-  Left ventricle: Systolic function was moderately reduced. Ejection   Fraction was estimated in the range of 35 % to 45 %. -  Right ventricle: The ventricle was markedly dilated. Systolic function was  markedly reduced. -  Aortic valve: Aortic valve is thickened and calcified with possible mild  stenosis (difficult visualization) but does not appear to be severe. There   Was mild regurgitation. Continues to receive prn lasix and has improving respiratory failure, RLL infiltrate with effusion. ROS:  Cardiovascular:  As noted above    Objective:      Vitals:    04/10/18 0900 04/10/18 0909 04/10/18 1009 04/10/18 1109   BP:  105/58 95/56 104/60   Pulse:  86 74 94   Resp:  15 (!) 33 26   Temp:       SpO2: 98% 98% 98% 97%   Weight:       Height:           Physical Exam:  General - appears chronically ill. Neck- supple, no JVD  CV- regular rate and rhythm no MRG  Lung- rhonchi  Abd- soft, nontender, nondistended  Ext- no edema bilaterally. Skin- warm and dry    Data Review:   Recent Labs      04/10/18   0432  04/09/18   0445   NA  147*  144   K  3.8  3.9   MG  1.7*   --    BUN  74*  69*   CREA  2.14*  2.16*   GLU  178*  183*   WBC  14.1*  10.2   HGB  11.5*  11.1*   HCT  35.4*  33.5*   PLT  161  151       Assessment/Plan:     Principal Problem:    Pneumonia of right lower lobe due to infectious organism ([facility] [address] (4/5/2018)      Active Problems:    Acute respiratory failure with hypoxia (HCC) (4/6/2018)        Encephalopathy (4/6/2018)          Pleural effusion (4/6/2018)        Cardiac arrest ([address] (4/6/2018)        Bacteremia (4/7/2018)        Volume overload (4/9/2018) - improved.       Comment: Add low dose metoprolol 12.5 mg BID - monitor BP and pulse. Conservative therapy advised at this time.           Mo Sinclair MD  4/10/2018 11:50 AM

## 2018-04-10 NOTE — PROGRESS NOTES
Care Daily Progress Note: 4/10/2018  Admission Date: 4/5/2018     The patient's chart is reviewed and the patient is discussed with the staff. Subjective:   78yo WM presented with cough, dyspnea, n/v/d. CT with mild infiltrates, R effusion. Admitted for CAP on 4/5. Arrested shortly after arriving to floor with brief compressions, intubation, and ROSC. He tolerated  cpap trial yesterday but was not extubated due to an increase in hr after suctioning. . Currently getting eeg      Current Facility-Administered Medications   Medication Dose Route Frequency    methylPREDNISolone (PF) (SOLU-MEDROL) injection 40 mg  40 mg IntraVENous DAILY    NUTRITIONAL SUPPORT ELECTROLYTE PRN ORDERS   Does Not Apply PRN    timolol (TIMOPTIC) 0.5 % ophthalmic solution 1 Drop  1 Drop Both Eyes DAILY    morphine injection 2 mg  2 mg IntraVENous Q3H PRN    white Petrolatum-Mineral Oil (AKWA TEARS) ophthalmic ointment   Both Eyes PRN    piperacillin-tazobactam (ZOSYN) 3.375 g in 0.9% sodium chloride (MBP/ADV) 100 mL  3.375 g IntraVENous Q8H    vancomycin (VANCOCIN) 1,000 mg in 0.9% sodium chloride (MBP/ADV) 250 mL  1 g IntraVENous Q24H    aspirin (ASA) suppository 300 mg  300 mg Rectal DAILY    pantoprazole (PROTONIX) 40 mg in sodium chloride 0.9% 10 mL injection  40 mg IntraVENous DAILY    sodium chloride (NS) flush 5-10 mL  5-10 mL IntraVENous Q8H    sodium chloride (NS) flush 5-10 mL  5-10 mL IntraVENous PRN    acetaminophen (TYLENOL) tablet 650 mg  650 mg Oral Q4H PRN    HYDROcodone-acetaminophen (NORCO) 5-325 mg per tablet 1 Tab  1 Tab Oral Q4H PRN    ondansetron (ZOFRAN) injection 4 mg  4 mg IntraVENous Q4H PRN    heparin (porcine) injection 5,000 Units  5,000 Units SubCUTAneous Q8H    albuterol-ipratropium (DUO-NEB) 2.5 MG-0.5 MG/3 ML  3 mL Nebulization Q4H PRN    NOREPINephrine (LEVOPHED) 4 mg in 5% dextrose 250 mL infusion  2-30 mcg/min IntraVENous TITRATE    propofol (DIPRIVAN) infusion  0-50 mcg/kg/min IntraVENous TITRATE       Review of Systems   Unobtainable due to patient status. Objective:     Vitals:    04/10/18 0509 04/10/18 0529 04/10/18 0632 04/10/18 0709   BP: 116/67  110/65 106/64   Pulse: 86 70 85 71   Resp: 14 11 9 9   Temp:       SpO2: 98% 97% 97% 98%   Weight:       Height:           Intake and Output:   04/08 1901 - 04/10 0700  In: 2743 [I.V.:460]  Out: 3125 [Urine:3125]       Physical Exam:          Constitutional:  intubated and mechanically ventilated. EENMT:  Sclera clear, pupils equal, oral mucosa moist  Respiratory: clear  Cardiovascular:  RRR with no M,G,R;  Gastrointestinal:  soft with no tenderness; positive bowel sounds present  Musculoskeletal:  warm with no cyanosis, no lower leg edema  Skin:  no jaundice or ecchymosis  Neurologic: no gross neuro deficits     Psychiatric:  Opens eyes and follows some commands     LINES:  ETT, central line    DRIPS:  none    CXR:        Ventilator Settings  Mode FIO2 Rate Tidal Volume Pressure PEEP   Pressure support  40 %    0 ml  10 cm H2O  8 cm H20      Peak airway pressure: 11 cm H2O   Minute ventilation: 6.1 l/min     ABG:   Recent Labs      04/10/18   0500  04/09/18   0240  04/08/18   0205   PH  7.33*  7.40  7.38   PCO2  51*  37  33*   PO2  81  112*  87   HCO3  26  23  19*        LAB  No results for input(s): GLUCPOC in the last 72 hours.     No lab exists for component: [place]  Recent Labs      04/10/18   0432  04/09/18   0445  04/08/18   0359   WBC  14.1*  10.2  13.2*   HGB  11.5*  11.1*  11.3*   HCT  35.4*  33.5*  34.5*   PLT  161  151  171     Recent Labs      04/10/18   0432  04/09/18   0445  04/08/18   0359   NA  147*  144  144   K  3.8  3.9  4.1   CL  109*  108*  108*   CO2  29  25  24   GLU  178*  183*  149*   BUN  74*  69*  59*   CREA  2.14*  2.16*  2.23*   MG  1.7*   --    --    PHOS  3.9*   --    --    CA  7.9*  8.1*  7.3*   ALB   --   2.3*   --      No results for input(s): LCAD, LAC in the last 72 hours.      Assessment:  (Medical Decision Making)     Hospital Problems  Never Reviewed          Codes Class Noted POA    Volume overload ICD-10-CM: E87.70  ICD-9-CM: 276.69  4/9/2018 Unknown    Over 8 L + since admit    Bacteremia ICD-10-CM: R78.81  ICD-9-CM: 790.7  4/7/2018 Unknown    sterp- ? contaminant    Acute respiratory failure with hypoxia (HCC) ICD-10-CM: J96.01  ICD-9-CM: 518.81  4/6/2018 Unknown    Appears to be tolerating cpap    Encephalopathy ICD-10-CM: G93.40  ICD-9-CM: 348.30  4/6/2018 Unknown    He does follow commands    Pleural effusion ICD-10-CM: J90  ICD-9-CM: 511.9  4/6/2018 Unknown    small    Cardiac arrest [facility]) ICD-10-CM: I46.9  ICD-9-CM: 427.5  4/6/2018 Unknown        * (Principal)Pneumonia of right lower lobe due to infectious organism [facility]) ICD-10-CM: J18.1  ICD-9-CM: 486  4/5/2018 Unknown    Nl beatriz          Plan:  (Medical Decision Making)   1   cpap trial then get abg  2    vanc and zosyn- day 4- could stop vanc- no mrsa  3    Lasix  [id number] yesterday- repeat  4   Check albumin- 2.3    --    More than 50% of the time documented was spent in face-to-face contact with the patient and in the care of the patient on the floor/unit where the patient is located.     Erroll Lombard, MD

## 2018-04-10 NOTE — PROGRESS NOTES
Ventilator check complete; patient has a # 8.0 ETT secured at the 25 cm at the lips. Patient is not sedated. Patient  Is not able to follow commands. Breath sounds are diminished b/l. Trachea is midline, negative for subcutaneous air, and chest excursion is equal and symmetrical. Patient is also positive for cyanosis and is negative for pitting edema. All alarms are set and audible. Resuscitation bag is at the head of the bed.       Ventilator Settings  Mode FIO2 Rate Tidal Volume Pressure PEEP I:E Ratio   CPAP  40 %    0 ml  0 cm H2O  8 cm H20  0      Peak airway pressure: 11 cm H2O   Minute ventilation: 6.2 l/min     ABG:   Recent Labs      04/10/18   0500  04/09/18   0240  04/08/18   0205   PH  7.33*  7.40  7.38   PCO2  51*  37  33*   PO2  81  112*  87   HCO3  26  23  19*         Pippa Selby, RT

## 2018-04-10 NOTE — PROGRESS NOTES
Ventilator check complete; patient has a #8. 0 ET tube secured at the 25 at the lip. Patient is not sedated. Patient is not able to follow commands. Breath sounds are clear. Trachea is midline, Negative for subcutaneous air, and chest excursion is symmetric. Patient is also Negative for cyanosis and is Negative for pitting edema. All alarms are set and audible. Resuscitation bag is at the head of the bed.       Ventilator Settings  Mode FIO2 Rate Tidal Volume Pressure PEEP I:E Ratio   CPAP  40 %    0 ml  0 cm H2O  8 cm H20  0      Peak airway pressure: 11 cm H2O   Minute ventilation: 5 l/min     ABG:   Recent Labs      04/09/18   0240  04/08/18   0205  04/07/18   0310   PH  7.40  7.38  7.41   PCO2  37  33*  34*   PO2  112*  87  88   HCO3  23  19*  21*         Indra Mccann, RT

## 2018-04-10 NOTE — PROGRESS NOTES
No change in assessment. No residual from tube feeding. Head of bed elevated. Bed in low/lock position.

## 2018-04-10 NOTE — PROGRESS NOTES
Bedside shift change report given to Aysha Monroe (oncoming nurse) by Raheem Javier RN (offgoing nurse). Report included the following information SBAR, Kardex, ED Summary, Intake/Output, MAR, Recent Results and Cardiac Rhythm A fib.

## 2018-04-10 NOTE — CONSULTS
Consult    Patient: Rasheed Whelan MRN: [id number]  SSN: [id number]    YOB: 1940  Age: 69 y.o. Sex: male      Subjective:      Rasheed Whelan is a 69 y.o. male who is being seen for evaluation of unresponsiveness. The patient is a 78-year-old gentleman whose history is largely unknown who came to the hospital feeling generally unwell was felt to have acute pulmonary issues and had a cardiopulmonary arrest within minutes of being on the floor. He had a very short time in terms of resuscitation. He has been somewhat hypotensive since that time. He has had increasing renal numbers suggestive of AKN. Meaningful cortical function has not returned since he has been extubated. The patient is not known to abuse alcohol. He was fully functional prior to his hospitalization            History reviewed. No pertinent past medical history. Past Surgical History:   Procedure Laterality Date    HX CATARACT REMOVAL      HX TONSILLECTOMY      HX VASECTOMY        History reviewed. No pertinent family history.   Social History   Substance Use Topics    Smoking status: Former Smoker    Smokeless tobacco: Never Used    Alcohol use No      Current Facility-Administered Medications   Medication Dose Route Frequency Provider Last Rate Last Dose    albuterol (PROVENTIL VENTOLIN) nebulizer solution 2.5 mg  2.5 mg Nebulization Q6HWA RT Sharita Ayala MD        aspirin (ASPIRIN) tablet 325 mg  325 mg Nasogastric DAILY Shila Anderson MD   325 mg at 04/10/18 1016    metoprolol tartrate (LOPRESSOR) tablet 12.5 mg  12.5 mg Oral BID Erick Hernández MD        methylPREDNISolone (PF) (SOLU-MEDROL) injection 40 mg  40 mg IntraVENous DAILY Sharita Ayala MD   40 mg at 04/10/18 0950    NUTRITIONAL SUPPORT ELECTROLYTE PRN ORDERS   Does Not Apply PRN Eric Casey MD        timolol (TIMOPTIC) 0.5 % ophthalmic solution 1 Drop  1 Drop Both Eyes DAILY Belgica Jung MD   1 Drop at 04/10/18 0940    morphine injection 2 mg  2 mg IntraVENous Q3H PRN Halley Vidal MD   2 mg at 04/09/18 1443    white Petrolatum-Mineral Oil (AKWA TEARS) ophthalmic ointment   Both Eyes PRN Halley Vidal MD        piperacillin-tazobactam (ZOSYN) 3.375 g in 0.9% sodium chloride (MBP/ADV) 100 mL  3.375 g IntraVENous Q8H Payal Dominguez MD 25 mL/hr at 04/10/18 0951 3.375 g at 04/10/18 0951    pantoprazole (PROTONIX) 40 mg in sodium chloride 0.9% 10 mL injection  40 mg IntraVENous DAILY Sandra Pinedo MD   40 mg at 04/10/18 0950    sodium chloride (NS) flush 5-10 mL  5-10 mL IntraVENous Q8H An Wilson MD   10 mL at 04/10/18 0520    sodium chloride (NS) flush 5-10 mL  5-10 mL IntraVENous PRN Madonna Fernandez MD        acetaminophen (TYLENOL) tablet 650 mg  650 mg Oral Q4H PRN Madonna Fernandez MD        HYDROcodone-acetaminophen (NORCO) 5-325 mg per tablet 1 Tab  1 Tab Oral Q4H PRN Madonna Fernandez MD        ondansetron (ZOFRAN) injection 4 mg  4 mg IntraVENous Q4H PRN Madonna Fernandez MD        heparin (porcine) injection 5,000 Units  5,000 Units SubCUTAneous Q8H Madonna Albert MD   5,000 Units at 04/10/18 0951    albuterol-ipratropium (DUO-NEB) 2.5 MG-0.5 MG/3 ML  3 mL Nebulization Q4H PRN Madonna Fernandez MD        NOREPINephrine (LEVOPHED) 4 mg in 5% dextrose 250 mL infusion  2-30 mcg/min IntraVENous TITRATE An Wilson MD   Stopped at 04/07/18 0158    propofol (DIPRIVAN) infusion  0-50 mcg/kg/min IntraVENous TITRATE An Wilson MD   Stopped at 04/06/18 0126        Allergies   Allergen Reactions    Antihistamines - Alkylamine Hives    Imodium [Loperamide] Hives    Tetracycline Hives       Review of Systems:  Given the patient's cognitive status at the present time this is not feasible    Objective: An EEG obtained this morning demonstrated diffuse slow background consistent with a encephalopathy but with no evidence of active seizure discharges.     CT brain scan was reviewed and did not demonstrate any acute process. While there is atrophy, some of which can be explained on the basis of dehydration there is very little in the way of periventricular white matter disease. There is some degree of enlargement of the fourth ventricle  Vitals:    04/10/18 0900 04/10/18 0909 04/10/18 1009 04/10/18 1109   BP:  105/58 95/56 104/60   Pulse:  86 74 94   Resp:  15 (!) 33 26   Temp:       SpO2: 98% 98% 98% 97%   Weight:       Height:            Physical Exam:  Tele med examination performed with the assistance of a registered nurse    The patient is a cachectic male who is lying in bed. He does not appear to be in respiratory distress. He has poor muscle bulk throughout. I was not able to see any atrophy or fasciculations    No specific response is noted in terms of vocal stimulation. The patient demonstrates a degree of blepharospasm. He does have a gaze preference which appears to be to the left. Corneal responses are present. Motor examination discloses poor muscle bulk throughout there is very reflexive response to deep painful stimulation    Assessment:   Evidence of a significant pan encephalopathy for which there is an extensive differential diagnosis. Hospital Problems  Never Reviewed          Codes Class Noted POA    Volume overload ICD-10-CM: E87.70  ICD-9-CM: 276.69  4/9/2018 Unknown        Bacteremia ICD-10-CM: R78.81  ICD-9-CM: 790.7  4/7/2018 Unknown        Acute respiratory failure with hypoxia [facility]) ICD-10-CM: J96.01  ICD-9-CM: 518.81  4/6/2018 Unknown        Encephalopathy ICD-10-CM: G93.40  ICD-9-CM: 348.30  4/6/2018 Unknown        Pleural effusion ICD-10-CM: J90  ICD-9-CM: 511.9  4/6/2018 Unknown        Cardiac arrest [facility]) ICD-10-CM: I46.9  ICD-9-CM: 427.5  4/6/2018 Unknown        * (Principal)Pneumonia of right lower lobe due to infectious organism [facility]) ICD-10-CM: J18.1  ICD-9-CM: 486  4/5/2018 Unknown              Plan:   MRI scan is been requested.     We most likely are dealing with a degree of post anoxic injury but there may be superimposed metabolic parameters. In terms of the patient's overall acute decline he has not been febrile and has had no other features which would suggest that he had an acute infectious etiology in terms of meningitis or encephalitis along with the demonstrated pulmonary process. Unfortunately given his renal function MRI with contrast cannot be performed which would allow better inspection of his meninges. We will await the results of the study however    He has been receiving tube feedings and therefore      appropriate trace element and vitamin supplementation.     I will add an IV supplement as well even though we do not have a history of alcoholism his overall body habitus would suggest that there has been poor nutrition for some time      I'll be pleased to follow him with you    Signed By: Michael Parham MD     April 10, 2018

## 2018-04-11 PROBLEM — I95.9 HYPOTENSION: Status: ACTIVE | Noted: 2018-01-01

## 2018-04-11 PROBLEM — J93.9 PNEUMOTHORAX ON LEFT: Status: ACTIVE | Noted: 2018-01-01

## 2018-04-11 NOTE — PROGRESS NOTES
Dr. Garrard Axon called ED emergently in regards to patient having L tension PTX following chest compressions and resuscitative efforts at around 3:17 AM. Stated that he was unable to perform chest tube placement and requested my assistance in the procedure. Upon presenting to the ICU, patient was found to be hypotensive and hypoxic. Additionally patient was found to have subcutaneous emphysema noted to left chest going up the left neck and obvious left sided rib fractures to palpation. No angiocaths present to perform needle decompression. Patient prepped with sterile technique. Given underlying traumatic etiology of PTX, a 28 [place] (only size available in ICU) chest tube was placed into patient's left 4/5 rib space at the midaxillary line. Chest tube secured in place with suture at 16. Chest tube connected to pleur-evac and placed on suction. Patient with immediate improvement of O2 saturation and blood pressure. O2 saturations improved to 99% and blood pressure improved to 110/60s.

## 2018-04-11 NOTE — PROGRESS NOTES
Emergent Intubation    70 yo M undergoing ACLS on my arrival. DL x1, Grade View 1. 8.0 ETT inserted with BBS positive and positive color change on EtCO2. Visible chest rise. After 14 minutes of ACLS a rhythm was reestablished for a period of time. Hospitalist at bedside managing ACLS and further pt care.

## 2018-04-11 NOTE — PROGRESS NOTES
Care Daily Progress Note: 4/11/2018  Admission Date: 4/5/2018     The patient's chart is reviewed and the patient is discussed with the staff. Subjective:   76yo WM presented with cough, dyspnea, n/v/d. CT with mild infiltrates, R effusion. Admitted for CAP on 4/5. Arrested shortly after arriving to floor with brief compressions, intubation, and ROSC. He tolerated  cpap trial yesterday but was not extubated due to an increase in hr after suctioning  He was extubated yesterday and did well unitl 2 am when he coded again and cpr was done  3 separate times for the next hour. He developed ptx from cpr and chest tube placed on R.   Pt is currently intubated and on vent-crtically ill on epi  Drip and amio      Current Facility-Administered Medications   Medication Dose Route Frequency    amiodarone (CORDARONE) 450 mg in dextrose 5% 250 mL infusion  0.5-1 mg/min IntraVENous TITRATE    EPINEPHrine (ADRENALIN) 4 mg in 0.9% sodium chloride 250 mL infusion  1-10 mcg/min IntraVENous TITRATE    EPINEPHrine (ADRENALIN) 0.1 mg/mL syringe    CODE BLUE    sodium bicarbonate 8.4 % (1 mEq/mL) injection   IntraVENous CODE BLUE    calcium chloride injection   IntraVENous CODE BLUE    amiodarone (CORDARONE) injection   IntraVENous CODE BLUE    lidocaine (PF) (XYLOCAINE) 100 mg/5 mL (2 %) injection syringe    CODE BLUE    aspirin (ASPIRIN) tablet 325 mg  325 mg Nasogastric DAILY    metoprolol tartrate (LOPRESSOR) tablet 12.5 mg  12.5 mg Per NG tube BID    methylPREDNISolone (PF) (SOLU-MEDROL) injection 40 mg  40 mg IntraVENous DAILY    NUTRITIONAL SUPPORT ELECTROLYTE PRN ORDERS   Does Not Apply PRN    timolol (TIMOPTIC) 0.5 % ophthalmic solution 1 Drop  1 Drop Both Eyes DAILY    morphine injection 2 mg  2 mg IntraVENous Q3H PRN    white Petrolatum-Mineral Oil (AKWA TEARS) ophthalmic ointment   Both Eyes PRN    piperacillin-tazobactam (ZOSYN) 3.375 g in 0.9% sodium chloride (MBP/ADV) 100 mL  3.375 g IntraVENous Q8H    pantoprazole (PROTONIX) 40 mg in sodium chloride 0.9% 10 mL injection  40 mg IntraVENous DAILY    sodium chloride (NS) flush 5-10 mL  5-10 mL IntraVENous Q8H    sodium chloride (NS) flush 5-10 mL  5-10 mL IntraVENous PRN    acetaminophen (TYLENOL) tablet 650 mg  650 mg Oral Q4H PRN    HYDROcodone-acetaminophen (NORCO) 5-325 mg per tablet 1 Tab  1 Tab Oral Q4H PRN    ondansetron (ZOFRAN) injection 4 mg  4 mg IntraVENous Q4H PRN    heparin (porcine) injection 5,000 Units  5,000 Units SubCUTAneous Q8H    albuterol-ipratropium (DUO-NEB) 2.5 MG-0.5 MG/3 ML  3 mL Nebulization Q4H PRN    NOREPINephrine (LEVOPHED) 4 mg in 5% dextrose 250 mL infusion  2-30 mcg/min IntraVENous TITRATE    propofol (DIPRIVAN) infusion  0-50 mcg/kg/min IntraVENous TITRATE       Review of Systems   Unobtainable due to patient status. Objective:     Vitals:    04/11/18 0707 04/11/18 0737 04/11/18 0738 04/11/18 0756   BP: 121/79 132/79     Pulse: 93 85 89 77   Resp: 21 22 18 22   Temp:  97.8 °F (36.6 °C)     SpO2: 96% 100% 99% 100%   Weight:       Height:           Intake and Output:   04/09 1901 - 04/11 0700  In: 4533.2 [I.V.:2669.2]  Out: 4956 [Urine:4800]  04/11 0701 - 04/11 1900  In: -   Out: 44     Physical Exam:          Constitutional:  intubated and mechanically ventilated.   EENMT:  Sclera clear, pupils equal, oral mucosa moist  Respiratory: crepitus, crackles  Cardiovascular:  RRR with no M,G,R;  Gastrointestinal:  soft with no tenderness; positive bowel sounds present  Musculoskeletal:  warm with no cyanosis, 1+ lower leg edema  Skin:  no jaundice or ecchymosis  Neurologic: no gross neuro deficits     Psychiatric:   Responds only to pain    LINES:  ETT, central line    DRIPS:  Epi, levophed, amio    CXR:        Ventilator Settings  Mode FIO2 Rate Tidal Volume Pressure PEEP   PRVC  90 %    500 ml  0 cm H2O  8 cm H20 (increased per MD order.)      Peak airway pressure: 12 cm H2O   Minute ventilation: 9.2 l/min     ABG:   Recent Labs      04/11/18   0630  04/11/18   0350  04/10/18   0850   PH  7.29*  7.14*  7.36   PCO2  52*  56*  49*   PO2  64*  176*  79*   HCO3  24  19*  27*        LAB  No results for input(s): GLUCPOC in the last 72 hours. No lab exists for component: [place]  Recent Labs      04/11/18   0449  04/10/18   0432  04/09/18   0445   WBC  13.2*  14.1*  10.2   HGB  8.5*  11.5*  11.1*   HCT  26.4*  35.4*  33.5*   PLT  135*  161  151     Recent Labs      04/11/18   0449  04/10/18   0432  04/09/18   0445   NA  147*  147*  144   K  4.3  3.8  3.9   CL  109*  109*  108*   CO2  26  29  25   GLU  307*  178*  183*   BUN  77*  74*  69*   CREA  2.34*  2.14*  2.16*   MG  2.2  1.7*   --    PHOS  6.7*  3.9*   --    CA  8.0*  7.9*  8.1*   ALB   --    --   2.3*     No results for input(s): LCAD, LAC in the last 72 hours.       Assessment:  (Medical Decision Making)     Hospital Problems  Never Reviewed          Codes Class Noted POA    Pneumothorax on left ICD-10-CM: J93.9  ICD-9-CM: 512.89  4/11/2018 Unknown    Post cpr    Hypotension ICD-10-CM: I95.9  ICD-9-CM: 458.9  4/11/2018 Unknown    Critically ill    Volume overload ICD-10-CM: E87.70  ICD-9-CM: 276.69  4/9/2018 Unknown        Bacteremia ICD-10-CM: R78.81  ICD-9-CM: 790.7  4/7/2018 Unknown        Acute respiratory failure with hypoxia (HCC) ICD-10-CM: J96.01  ICD-9-CM: 518.81  4/6/2018 Unknown    Back on vent post arrest - on 90% fio2-critically ill    Encephalopathy ICD-10-CM: G93.40  ICD-9-CM: 348.30  4/6/2018 Unknown    anoxic    Pleural effusion ICD-10-CM: J90  ICD-9-CM: 511.9  4/6/2018 Unknown    small    Cardiac arrest [facility]) ICD-10-CM: I46.9  ICD-9-CM: 427.5  4/6/2018 Unknown    x2    * (Principal)Pneumonia of right lower lobe due to infectious organism [facility]) ICD-10-CM: J18.1  ICD-9-CM: 709  4/5/2018 Unknown              Plan:  (Medical Decision Making)   1    Vent support- try to wean fio2  2    Neuro evaluating  3    vanc and zosyn day 5  4    Son is coming to see  5   Chest tube to suction  6   pressors  --  Critical care time 46 minutes  More than 50% of the time documented was spent in face-to-face contact with the patient and in the care of the patient on the floor/unit where the patient is located.     Brittny Krishnan MD

## 2018-04-11 NOTE — INTERDISCIPLINARY ROUNDS
Interdisciplinary team rounds were held 4/11/2018 with the following team members: Care Management, Nursing, Physical Therapy and Physician. Plan of care discussed. See clinical pathway and/or care plan for interventions and desired outcomes.

## 2018-04-11 NOTE — PROGRESS NOTES
Ventilator check complete; patient has a #8. 0 ET tube secured at the 26 at the lip. Patient is sedated. Patient is not able to follow commands. Breath sounds are clear and diminished. Trachea is midline, Positive for subcutaneous air, and chest excursion is symmetric. Patient is also Negative for cyanosis and is Positive for pitting edema. All alarms are set and audible. Resuscitation bag is at the head of the bed.       Ventilator Settings  Mode FIO2 Rate Tidal Volume Pressure PEEP I:E Ratio   PRVC  90 %    500 ml  0 cm H2O  5 cm H20  1:3.7      Peak airway pressure: 12 cm H2O   Minute ventilation: 9.2 l/min     ABG:   Recent Labs      04/11/18   0630  04/11/18   0350  04/10/18   0850   PH  7.29*  7.14*  7.36   PCO2  52*  56*  49*   PO2  64*  176*  79*   HCO3  24  19*  27*         Felipa Rodas, RT

## 2018-04-11 NOTE — PROCEDURES
Emergent Intubation  Performed by: Shadi Parra  Authorized by: Ignacio IBARRA     Emergent Intubation:   Location:  ICU  Date/Time:  4/11/2018 2:30 AM  Indications:  Respiratory failure    Spontaneous Ventilation: absent    Level of Consciousness: unresponsive    Preoxygenated: No      Airway Documentation:   Airway:  ETT - Cuffed  Technique:  Direct laryngoscopy  Insertion Site:  Oral  Blade Type:  Naye  Blade Size:  4  ETT size (mm):  8.0  ETT Line [place]:  Lips  ETT Insertion depth (cm):  22  Placement verified by: auscultation, EtCO2 and BBS    Attempts:  1  Difficult airway: No    Positive color change on EtCO2 device multiple times.

## 2018-04-11 NOTE — PROGRESS NOTES
Discussion with Ricky Sheriff. Son on way from [place]. I will make periodic rounds and support family. Chaplains remain available for support. Miles Polanco M.Div.

## 2018-04-11 NOTE — PROGRESS NOTES
Late entry due to hands on patient care. Intermittent V. Tach and lost pulse again. Acls resumed. See Code sheet for medication.

## 2018-04-11 NOTE — PROGRESS NOTES
Bedside shift report received from Jeanenne Skiff, [facility]. Pt on vent, no command following. Responds to pain in all extremities. All extremities cool with present pulses. VSS at this time on gtts.      Lines:  R quad lumen IJ    Drains:  NGT  Queen  ETT  L chest tube - air leak noted, subQ air remains present on L side    Airway:  ETT    Drips:  NS @ 200 mL/hr  Amio @ 1 mg/min  Epi @ 10 mcg/min  Levo @ 16 mcg/min

## 2018-04-11 NOTE — PROGRESS NOTES
Patient was intubated with a number 8.0 ET Tube. Tube placement verified by auscultation and ETCO2 monitor. ET Tube is secured at the 27 cm chantale at the lip and on the right side. Patient was intubated by Dr David Romano on the 1 attempt. Breath sounds are coarse. Patient is Negative for subcutaneous air and chest excursion is symmetric. Trachea is midline. Patient is also Negative for cyanosis and is Negative for pitting edema. Patient placed on ventilator on documented settings. All alarms are set and audible. Resuscitation bag is at the head of the bed.       Ventilator Settings  Mode FIO2 Rate Tidal Volume Pressure PEEP I:E Ratio   PRVC  80 % (weaned from 100% post ABG)    450 ml (increased from 400 post ABG)  0 cm H2O  5 cm H20  1:3.7      Peak airway pressure: 11 cm H2O   Minute ventilation: 7.8 l/min     ABG:   Recent Labs      04/11/18   0350  04/10/18   0850  04/10/18   0500   PH  7.14*  7.36  7.33*   PCO2  56*  49*  51*   PO2  176*  79*  81   HCO3  19*  27*  26

## 2018-04-11 NOTE — PROGRESS NOTES
Late entry due to hands on patient care. Chest tube place by . Chest xray showed left -sided tension pneumothorax.

## 2018-04-11 NOTE — PROGRESS NOTES
HOSPITALIST CODE BLUE NOTE    NAME:  Nixon Camp   Age:  69 y.o.  :   1940   MRN:   [id number]  PCP: Not On File [facility]  Consulting MD:  Treatment Team: Attending Provider: Lian Ruiz MD; Consulting Provider: Jhonatan Alicea MD; Consulting Provider: Flori Chacon MD; Care Manager: Elen Mortensen; Consulting Provider: Mary Anne Whitfield MD; Consulting Provider: Tre Davis MD; Consulting Provider: Tasia Purdy NP; Utilization Review: Joe Obregon RN    REASON FOR CODE BLUE: apnea, PEA, v-fib    INTERVAL HISTORY:   Nixon Camp is a 69y.o. year-old male admitted for pneumonia with subsequent cardiac arrest on medical floor the night of his admission. This initial CODE BLUE on 18 attained ROSC after chest compressions, intubation, and mechanical ventilation. The patient was extubated yesterday morning. Per family, the patient was to be FULL CODE and reintubated if necessary. I was called by nursing staff around 02:15 for loss of respirations. The patient maintained a pulse and electrical activity for a short time before losing pulse. PEA was diagnosed and a CODE BLUE was initiated at [address] ACLS was initiated: epinephrine dose #1 was given at 0221, #2 at 0223. Pt was intubated by Dr. Joe Lerma of anesthesiology around 1752. Epinephrine dose #3 was given at 0225. Sodium bicarb 1 amp dose #1 was given at 0228, and epinephrine dose #4 was given at 0229. Pulse check at 0230 showed ventricular fibrillation and the patient was defibrillated with 120 J biphasic shock. Epinephrine dose # 5 was given at 0231 and bicarb dose #2 was given at [phone]. Pulse check at 0232 showed pesistent ventricular fib and a second shock of 200 J biphasic was given. Epinephrine dose #6 was given 0234. Pt achieved ROSC at 0236 showing sinus tachycardia with ST elevation and femoral/carotid pulses present. Amiodarone 150 mg was given at 0240 and lidocaine gtt started at 2mg/min at 0245.      Pt was noted to be entering intermittent Vtach, and lost pulse again at 0249. ACLS was resumed at this time. Epinephrine #7 was given at 0250, 1 g Calcium gluconate as given at 0251. Epinephrine #8 given at 0252. Pt regained pulse with ROSC at 0253, but lost pulse again at 0255. ACLS was started for a third time. Epinephrine #9 was given at 0255, #10 at 0256, with return of ROSC again at 0257. IV Levophed was started at [facility]. As soon as on-call pharmacist arrived around 0330, IV epinephrine drip and IV amiodarone drip were initiated and IV lidocaine was stopped. The patient was noted to have significant subcutaneous emphysema and CXR from 317 showed left-sided tension pneumothorax. Dr. Darcie Leavitt of ER was called and came to bedside to place chest tube. REVIEW OF SYSTEMS: Unable to obtain given pt's obtunded status. Prior to Admission Medications   Prescriptions Last Dose Informant Patient Reported? Taking? cholecalciferol (VITAMIN D3) 1,000 unit cap   Yes Yes   Sig: Take  by mouth daily. folic acid 988 mcg tablet   Yes Yes   Sig: Take 400 mcg by mouth daily. Facility-Administered Medications: None       Objective:     Visit Vitals    BP (!) 89/55    Pulse 82    Temp 98 °F (36.7 °C)    Resp 15    Ht 6' 1\" (1.854 m)    Wt 64.9 kg (143 lb)    SpO2 96%    BMI 18.87 kg/m2      Temp (24hrs), Av.2 °F (36.8 °C), Min:98 °F (36.7 °C), Max:98.4 °F (36.9 °C)    Oxygen Therapy  O2 Sat (%): 96 % (18)  Pulse via Oximetry: 92 beats per minute (18)  O2 Device: Hi flow nasal cannula; Heated;Humidifier (18)  O2 Flow Rate (L/min): 40 l/min (18)  O2 Temperature: 87.8 °F (31 °C) (18)  FIO2 (%): 47 % (18)  ETCO2 (mmHg): 96 mmHg (18 0842)  Physical Exam:  General:    The patient is a cachectic elderly male intubated but in no acute distress. Head:   Normocephalic/atraumatic.    Eyes:  No palpebral pallor or scleral icterus, no ocular reflex. ENT:  External auricular and nasal exam within normal limits. Mucous membranes are moist.  Neck:  Loss of external architecture with palpable subcutaneous emphysema over L neck, chest, and arm, as well as less over R neck and chest.  Lungs:   Scattered crackles, chest tube in place over left thorax, c/d/i. Diminished sounds over L lung. No respiratory distress or accessory muscle use. Heart:   Tachycardic, regular rhythm, without murmurs, rubs, or gallops. Abdomen:   Soft, non-distended with normoactive bowel sounds. Extremities: Without clubbing, cyanosis, 1+ pitting edema over BLE and lower abdomen. Pulses: Radial pulse present, lower extremities cold.   Neurologic: GCS 3    Data Review:   Recent Results (from the past 24 hour(s))   METABOLIC PANEL, BASIC    Collection Time: 04/10/18  4:32 AM   Result Value Ref Range    Sodium 147 (H) 136 - 145 mmol/L    Potassium 3.8 3.5 - 5.1 mmol/L    Chloride 109 (H) 98 - 107 mmol/L    CO2 29 21 - 32 mmol/L    Anion gap 9 7 - 16 mmol/L    Glucose 178 (H) 65 - 100 mg/dL    BUN 74 (H) 8 - 23 MG/DL    Creatinine 2.14 (H) 0.8 - 1.5 MG/DL    GFR est AA 39 (L) >60 ml/min/1.73m2    GFR est non-AA 32 (L) >60 ml/min/1.73m2    Calcium 7.9 (L) 8.3 - 10.4 MG/DL   CBC W/O DIFF    Collection Time: 04/10/18  4:32 AM   Result Value Ref Range    WBC 14.1 (H) 4.3 - 11.1 K/uL    RBC 3.64 (L) 4.23 - 5.67 M/uL    HGB 11.5 (L) 13.6 - 17.2 g/dL    HCT 35.4 (L) 41.1 - 50.3 %    MCV 97.3 79.6 - 97.8 FL    MCH 31.6 26.1 - 32.9 PG    MCHC 32.5 31.4 - 35.0 g/dL    RDW 14.1 11.9 - 14.6 %    PLATELET 289 260 - 693 K/uL    MPV 11.1 10.8 - 14.1 FL   MAGNESIUM    Collection Time: 04/10/18  4:32 AM   Result Value Ref Range    Magnesium 1.7 (L) 1.8 - 2.4 mg/dL   PHOSPHORUS    Collection Time: 04/10/18  4:32 AM   Result Value Ref Range    Phosphorus 3.9 (H) 2.3 - 3.7 MG/DL   BLOOD GAS, ARTERIAL    Collection Time: 04/10/18  5:00 AM   Result Value Ref Range    pH 7.33 (L) 7.35 - 7.45      PCO2 51 (H) 35 - 45 mmHg    PO2 81 80 - 105 mmHg    BICARBONATE 26 22 - 26 mmol/L    BASE DEFICIT 0.3 0 - 2 mmol/L    SITE RR     ALLENS TEST POSITIVE      MODE CPAP     Tidal volume 408.0      RATE 13.0     BLOOD GAS, ARTERIAL    Collection Time: 04/10/18  8:50 AM   Result Value Ref Range    pH 7.36 7.35 - 7.45      PCO2 49 (H) 35 - 45 mmHg    PO2 79 (L) 80 - 105 mmHg    BICARBONATE 27 (H) 22 - 26 mmol/L    BASE EXCESS 0.7 0 - 3 mmol/L    SITE LR     ALLENS TEST NA     MODE CPAP     Respiratory comment:  at 4 10 2018 8 55 23 AM. Read back. AMMONIA    Collection Time: 04/10/18 12:45 PM   Result Value Ref Range    Ammonia <10 (L) 11 - 32 UMOL/L       Imaging Ranjit Ball /Studies:  Xr Chest Sngl V    Result Date: 4/11/2018  IMPRESSION: Large tension left pneumothorax with diffuse subcutaneous emphysema new in the interval. DC 5: This was called emergently to the ICU. Xr Chest Port    Result Date: 4/10/2018  IMPRESSION: Right pleural effusion and right lower lobe atelectasis or pneumonia unchanged. Assessment and Plan:     - ROSC achieved. Pt currently in sinus tachycardia on ventilator, IV Epinephrine, Norepinephrine, Amiodarone drips. Will continue. Follow up cardiology, pulmonology consultations. - Disposition: Still FULL CODE per family. Will continue to monitor in ICU. Prognosis is extremely poor. Highly unlikely to leave hospital alive given recurrent loss of spontaneous circulation.     - Critical care time: 90 minutes (this does not include 32 minutes of face-to-face time spent actively performing ACLS)  More than 50% of the time documented was spent in face-to-face contact with the patient and in the care of the patient on the floor/unit where the patient is located.     Signed By: Douglas Powell MD     April 11, 2018

## 2018-04-11 NOTE — PROGRESS NOTES
Problem: Nutrition Deficit  Goal: *Optimize nutritional status  Nutrition Follow Up:  Reason for initial assessment: Consult for TF management per nutritional support protocols per Dr. Spencer Elizondo. Malnutrition screening tool trigger for weight loss; unknown amount of weight loss and eating poorly r/t decreased appetite. Assessment:  Current Diet:  NPO   EN:  Jevity 1.2 at 60 ml/hr with 25 ml/hr water flush  FT in place:  Abdomen slightly firm with positive bowel sounds. Date of Last BM: 4/11  Pt re-intubated , poorly responsive s/p arrest early this am.  Tube feeding initiated 4/07; pt tolerating  tube feeding of Jevity 1.2 at 60 ml/hr; no residuals noted. Extremities:  1 + pitting edema BLE and lower abdomen  Pertinent Medications:  Zosyn, 1 dose Lasix this am, SoluMedrol  Pertinent Rx:  Hypernatremia-147, blood sugar 307-on solumedrol, Hyperphosphatemia 6.7, Hypomagnesemia resolved. Worsening renal function  Anthropometrics:Height: 6' 1\" (185.4 cm), Weight Source: Bed, Weight: 63.7 kg (140 lb 8 oz), Body mass index is 18.54 kg/(m^2). BMI class of Underweight for Older American Male.  Pt currently at 76% IBW. Weight:  4/11: 64.4 kg-bed weight  Macronutrient needs:  EER:  4653-2422 kcal /day (28-32 kcal/kg BW)  EPR:  64-76 grams protein/day (1-1.2 grams/kg BW) GFR 32  Max CHO:  Max 255 grams/day (50% kcal)  Fluid:  1ml/kcal  Intake/Comparative standards: Current NPO status does not meet kcal or protein needs. Current tube feeding meets 97% estimated kcal needs and 105% estimated protein needs. Intervention:  Meals and snacks: NPO; advance per MD recommendations  EN:  Continue TF of Jevity 1.2 -reduce rate from 60 ml/hr to 50 ml/hr and water flush from 25 ml/hr to 20 ml/hr.  Tube feeding /water flushes to provide 1440 kcal/day (81% of needs), 67 grams protein/day (100% of needs), 203 grams CHO/day (does not exceed max CHO),  and ~ 1448 ml free water/day (100% of needs).   Nutrition Supplement Therapy: Electrolyte replacement per nutritional support protocols are active on MAR.   Labs: BMP daily,  Phos in am  Coordination of nutrition care:  Dr. Diaz Payment r/t labs  Discharge nutrition plan: Too soon to determine      Sharonda Chua RD, LD, MPH  [phone]  Bárbara Cisneros

## 2018-04-11 NOTE — PROGRESS NOTES
MD at bedside and updated on pt's condition and current drips. Pt with minimal UOP since beginning of shift - 15 mL clear, yellow urine. Pt with spontaneous eye opening, moving eyes, but no tracking or purposeful command following.  MD notified that pt's son is on his way from [place], [place].

## 2018-04-11 NOTE — PROGRESS NOTES
Subjective:   Daily Progress Note: 4/11/2018 4:10 PM    Unresponsive    Current Facility-Administered Medications   Medication Dose Route Frequency    amiodarone (CORDARONE) 450 mg in dextrose 5% 250 mL infusion  0.5-1 mg/min IntraVENous TITRATE    EPINEPHrine (ADRENALIN) 4 mg in 0.9% sodium chloride 250 mL infusion  1-10 mcg/min IntraVENous TITRATE    EPINEPHrine (ADRENALIN) 0.1 mg/mL syringe    CODE BLUE    sodium bicarbonate 8.4 % (1 mEq/mL) injection   IntraVENous CODE BLUE    calcium chloride injection   IntraVENous CODE BLUE    amiodarone (CORDARONE) injection   IntraVENous CODE BLUE    lidocaine (PF) (XYLOCAINE) 100 mg/5 mL (2 %) injection syringe    CODE BLUE    aspirin (ASPIRIN) tablet 325 mg  325 mg Nasogastric DAILY    metoprolol tartrate (LOPRESSOR) tablet 12.5 mg  12.5 mg Per NG tube BID    methylPREDNISolone (PF) (SOLU-MEDROL) injection 40 mg  40 mg IntraVENous DAILY    NUTRITIONAL SUPPORT ELECTROLYTE PRN ORDERS   Does Not Apply PRN    timolol (TIMOPTIC) 0.5 % ophthalmic solution 1 Drop  1 Drop Both Eyes DAILY    morphine injection 2 mg  2 mg IntraVENous Q3H PRN    white Petrolatum-Mineral Oil (AKWA TEARS) ophthalmic ointment   Both Eyes PRN    piperacillin-tazobactam (ZOSYN) 3.375 g in 0.9% sodium chloride (MBP/ADV) 100 mL  3.375 g IntraVENous Q8H    pantoprazole (PROTONIX) 40 mg in sodium chloride 0.9% 10 mL injection  40 mg IntraVENous DAILY    sodium chloride (NS) flush 5-10 mL  5-10 mL IntraVENous Q8H    sodium chloride (NS) flush 5-10 mL  5-10 mL IntraVENous PRN    acetaminophen (TYLENOL) tablet 650 mg  650 mg Oral Q4H PRN    HYDROcodone-acetaminophen (NORCO) 5-325 mg per tablet 1 Tab  1 Tab Oral Q4H PRN    ondansetron (ZOFRAN) injection 4 mg  4 mg IntraVENous Q4H PRN    heparin (porcine) injection 5,000 Units  5,000 Units SubCUTAneous Q8H    albuterol-ipratropium (DUO-NEB) 2.5 MG-0.5 MG/3 ML  3 mL Nebulization Q4H PRN    NOREPINephrine (LEVOPHED) 4 mg in 5% dextrose 250 mL infusion  2-30 mcg/min IntraVENous TITRATE    propofol (DIPRIVAN) infusion  0-50 mcg/kg/min IntraVENous TITRATE               Objective:     Visit Vitals    /49    Pulse 67    Temp 97.7 °F (36.5 °C)    Resp 18    Ht 6' 1\" (1.854 m)    Wt 64.4 kg (142 lb)    SpO2 100%    BMI 18.73 kg/m2    O2 Flow Rate (L/min): 40 l/min O2 Device: Endotracheal tube, Ventilator    Temp (24hrs), Av.9 °F (36.6 °C), Min:97.7 °F (36.5 °C), Max:98.1 °F (36.7 °C)      701 - 1900  In: -   Out: 239 [Urine:15]  1901 - 700  In: 4533.2 [I.V.:2669.2]  Out: 7460 [Urine:4800]      Visit Vitals    /49    Pulse 67    Temp 97.7 °F (36.5 °C)    Resp 18    Ht 6' 1\" (1.854 m)    Wt 64.4 kg (142 lb)    SpO2 100%    BMI 18.73 kg/m2        Lungs: clear to auscultation bilaterally  Heart: regular rate and rhythm  Abdomen: soft, non-tender.   Extremities: tr edema          Data Review    Recent Results (from the past 50 hour(s))   VANCOMYCIN, TROUGH    Collection Time: 18 10:39 PM   Result Value Ref Range    Vancomycin,trough 26.6 (HH) 5 - 20 ug/mL   METABOLIC PANEL, BASIC    Collection Time: 04/10/18  4:32 AM   Result Value Ref Range    Sodium 147 (H) 136 - 145 mmol/L    Potassium 3.8 3.5 - 5.1 mmol/L    Chloride 109 (H) 98 - 107 mmol/L    CO2 29 21 - 32 mmol/L    Anion gap 9 7 - 16 mmol/L    Glucose 178 (H) 65 - 100 mg/dL    BUN 74 (H) 8 - 23 MG/DL    Creatinine 2.14 (H) 0.8 - 1.5 MG/DL    GFR est AA 39 (L) >60 ml/min/1.73m2    GFR est non-AA 32 (L) >60 ml/min/1.73m2    Calcium 7.9 (L) 8.3 - 10.4 MG/DL   CBC W/O DIFF    Collection Time: 04/10/18  4:32 AM   Result Value Ref Range    WBC 14.1 (H) 4.3 - 11.1 K/uL    RBC 3.64 (L) 4.23 - 5.67 M/uL    HGB 11.5 (L) 13.6 - 17.2 g/dL    HCT 35.4 (L) 41.1 - 50.3 %    MCV 97.3 79.6 - 97.8 FL    MCH 31.6 26.1 - 32.9 PG    MCHC 32.5 31.4 - 35.0 g/dL    RDW 14.1 11.9 - 14.6 %    PLATELET 331 142 - 243 K/uL    MPV 11.1 10.8 - 14.1 FL   MAGNESIUM Collection Time: 04/10/18  4:32 AM   Result Value Ref Range    Magnesium 1.7 (L) 1.8 - 2.4 mg/dL   PHOSPHORUS    Collection Time: 04/10/18  4:32 AM   Result Value Ref Range    Phosphorus 3.9 (H) 2.3 - 3.7 MG/DL   BLOOD GAS, ARTERIAL    Collection Time: 04/10/18  5:00 AM   Result Value Ref Range    pH 7.33 (L) 7.35 - 7.45      PCO2 51 (H) 35 - 45 mmHg    PO2 81 80 - 105 mmHg    BICARBONATE 26 22 - 26 mmol/L    BASE DEFICIT 0.3 0 - 2 mmol/L    SITE RR     ALLENS TEST POSITIVE      MODE CPAP     Tidal volume 408.0      RATE 13.0     BLOOD GAS, ARTERIAL    Collection Time: 04/10/18  8:50 AM   Result Value Ref Range    pH 7.36 7.35 - 7.45      PCO2 49 (H) 35 - 45 mmHg    PO2 79 (L) 80 - 105 mmHg    BICARBONATE 27 (H) 22 - 26 mmol/L    BASE EXCESS 0.7 0 - 3 mmol/L    SITE LR     ALLENS TEST NA     MODE CPAP     Respiratory comment:  at 4 10 2018 8 55 23 AM. Read back. AMMONIA    Collection Time: 04/10/18 12:45 PM   Result Value Ref Range    Ammonia <10 (L) 11 - 32 UMOL/L   BLOOD GAS, ARTERIAL    Collection Time: 04/11/18  3:50 AM   Result Value Ref Range    pH 7.14 (L) 7.35 - 7.45      PCO2 56 (H) 35 - 45 mmHg    PO2 176 (H) 80 - 105 mmHg    BICARBONATE 19 (L) 22 - 26 mmol/L    BASE DEFICIT 10.7 (H) 0 - 2 mmol/L    SITE RB     ALLENS TEST NA     MODE PRVC     FIO2 100.0 %    Tidal volume 400.0      RATE 15.0      PEEP/CPAP 5.0      Respiratory comment: Dr Joao Tony at 4 11 2018 4 10 02 AM. Read back.     METABOLIC PANEL, BASIC    Collection Time: 04/11/18  4:49 AM   Result Value Ref Range    Sodium 147 (H) 136 - 145 mmol/L    Potassium 4.3 3.5 - 5.1 mmol/L    Chloride 109 (H) 98 - 107 mmol/L    CO2 26 21 - 32 mmol/L    Anion gap 12 7 - 16 mmol/L    Glucose 307 (H) 65 - 100 mg/dL    BUN 77 (H) 8 - 23 MG/DL    Creatinine 2.34 (H) 0.8 - 1.5 MG/DL    GFR est AA 35 (L) >60 ml/min/1.73m2    GFR est non-AA 29 (L) >60 ml/min/1.73m2    Calcium 8.0 (L) 8.3 - 10.4 MG/DL   CBC W/O DIFF    Collection Time: 04/11/18  4:49 AM Result Value Ref Range    WBC 13.2 (H) 4.3 - 11.1 K/uL    RBC 2.69 (L) 4.23 - 5.67 M/uL    HGB 8.5 (L) 13.6 - 17.2 g/dL    HCT 26.4 (L) 41.1 - 50.3 %    MCV 98.1 (H) 79.6 - 97.8 FL    MCH 31.6 26.1 - 32.9 PG    MCHC 32.2 31.4 - 35.0 g/dL    RDW 14.0 11.9 - 14.6 %    PLATELET 210 (L) 924 - 450 K/uL    MPV 11.5 10.8 - 14.1 FL   MAGNESIUM    Collection Time: 04/11/18  4:49 AM   Result Value Ref Range    Magnesium 2.2 1.8 - 2.4 mg/dL   PHOSPHORUS    Collection Time: 04/11/18  4:49 AM   Result Value Ref Range    Phosphorus 6.7 (H) 2.3 - 3.7 MG/DL   TROPONIN I    Collection Time: 04/11/18  4:49 AM   Result Value Ref Range    Troponin-I, Qt. 3.40 (HH) 0.02 - 0.05 NG/ML   BLOOD GAS, ARTERIAL    Collection Time: 04/11/18  6:30 AM   Result Value Ref Range    pH 7.29 (L) 7.35 - 7.45      PCO2 52 (H) 35 - 45 mmHg    PO2 64 (L) 80 - 105 mmHg    BICARBONATE 24 22 - 26 mmol/L    BASE DEFICIT 3.2 (H) 0 - 2 mmol/L    SITE RB     ALLENS TEST NA     MODE PRVC     FIO2 80.0 %    Tidal volume 500.0      PEEP/CPAP 5.0      Respiratory comment: Marjorie HAYWARD at 4 11 2018 6 47 00 AM. Read back. Assessment     Patient Active Problem List    Diagnosis Date Noted    Pneumothorax on left 04/11/2018    Hypotension 04/11/2018    Volume overload 04/09/2018    Bacteremia 04/07/2018    Acute respiratory failure with hypoxia (HCC) 04/06/2018    Encephalopathy 04/06/2018    Pleural effusion 04/06/2018    Cardiac arrest ([facility] 04/06/2018    Pneumonia of right lower lobe due to infectious organism ([facility] [address] 04/05/2018           Problems Addressed by Nephrology     MORENO - ATN - oliguric  Shock  Respiratory Failure  Encephalopathy    Plan     Unfortunately now oliguric after arrest. He is hemodynamically stable and electrolytes are unremarkable. He is an extraordinarily poor dialysis candidate and dialysis would not meaningfully improve his outcome. Continue support for now. I agree with DNR status.

## 2018-04-11 NOTE — PROGRESS NOTES
Patient with labored respiration. Patient maintain a pulse and electrical activity for short time before losing pulse. PEA and a Code Blue initiated at [employer]. ACLS was initiated. See Code sheet for medication. Intubated by  of anesthesiology around [address] [facility]. Pulse check at 0230/ventricular fibrillation and was defibrillated with 120 J biphasic shock. Medication given . See Code sheet. Pulse check at 0232. Persistent ventricular fibrillation and second shock of 200 J biphasic was given. Medication was given . See Code sheet for medication. Patient achieved ROSC at 0236 showing Sinus tach with ST elevation. Femoral carotid pulses present.

## 2018-04-11 NOTE — PROGRESS NOTES
Patient with eyes open. Patient do not track or look at you when name called. No commands follow. Patient remain intubated. Sinus with frequent PVC'S and PAC'S. Heart rate 84. Patient remain on Epi ,Amiodarone and Levophed drips. Head of bed elevated. No residual from tube feeding. Crepitus to bilateral upper chest. Chest to 20 cm of suction. Air Leak. MD aware. Dressing intact. Extremities are cool to touch and mottled. Bed in low/lock position. Bed alarm on.

## 2018-04-11 NOTE — PROGRESS NOTES
Hospitalist Progress Note    2018  Admit Date: 2018  5:31 PM   NAME: Detra Brunner   :  1940   MRN:  [id number]   Attending: Anju Mcleod MD  PCP:  Not On File [facility]    SUBJECTIVE:     Detra Brunner is a 68 yo male with unremarkable medical history admitted initially with resp distress, PNA and new left pulm nodule and hilar lymph node on CT scan. Patient has cardiopulmonary arrest immediately on medical floor and required intubation. ROSC was achieved quickly and no medications used. Anesthesiology placed central and arterial line and patient transported to ICU on vent with sedation. Pulmonary consulted for vent management. 4/10 - Patient remains intubated at time of exam, with plans per Pulmonology to attempt extubation today. Patient opens his eyes on verbal command, but does not follow my requests otherwise. Per nursing staff, on evaluation by Dr. Gary Chatterjee this morning, patient did follow his commands.  - CODE BLUE called early this morning on patient. See documentation by nocturnist for details. ROSC occurred. Patient did suffer rib fractures and tension pneumothorax with subsequent chest tube placement. He is currently on levophed, epi, and amiodarone drips. His mentation is even more decreased than yesterday. Does not follow commands. Does not hold eye contact. Patient is re-intubated following code blue as well. ROS unable to be obtained from patient 2/2 condition.       PHYSICAL EXAM       Visit Vitals    /79 (BP 1 Location: Right arm, BP Patient Position: At rest;Head of bed elevated (Comment degrees))    Pulse 77    Temp 97.8 °F (36.6 °C)    Resp 22    Ht 6' 1\" (1.854 m)    Wt 64.4 kg (142 lb)    SpO2 100%    BMI 18.73 kg/m2      Temp (24hrs), Av.1 °F (36.7 °C), Min:97.8 °F (36.6 °C), Max:98.4 °F (36.9 °C)    Oxygen Therapy  O2 Sat (%): 100 % (18 0822)  Pulse via Oximetry: 84 beats per minute (18 0737)  O2 Device: Endotracheal tube;Ventilator (04/11/18 0737)  O2 Flow Rate (L/min): 40 l/min (04/11/18 0140)  O2 Temperature: 87.8 °F (31 °C) (04/11/18 0140)  FIO2 (%): 85 % (weaned per MD order. ) (04/11/18 0822)  ETCO2 (mmHg): 96 mmHg (04/09/18 0842)    Intake/Output Summary (Last 24 hours) at 04/11/18 0918  Last data filed at 04/11/18 0737   Gross per 24 hour   Intake           3529.2 ml   Output             2800 ml   Net            729.2 ml          General: Intubated on vent. Frail and cachetic  Head:  Atraumatic Normocephalic. Eyes:  Pupils reactive bilaterally  ENT:  No discharges/lesions. Lungs:  Coarse and crackles bilaterally. Chest tube in L lateral chest draining serosanguinous fluid. Crepitus present extending to neck, L arm, L abdomen from L chest  CVS:  Regular rate and rhythm, no murmur, rub, or gallop, no JVD  Abdomen: Thin, Slightly firm with mild abdominal wall edema, Positive bowel sounds. :  Scrotal and penile edema, no erythema, beebe in place  Neurologic:  Eyes open. Does not focus on my gaze. Does not respond to my commands. No spontaneous limb movement during examination        Recent Results (from the past 24 hour(s))   AMMONIA    Collection Time: 04/10/18 12:45 PM   Result Value Ref Range    Ammonia <10 (L) 11 - 32 UMOL/L   BLOOD GAS, ARTERIAL    Collection Time: 04/11/18  3:50 AM   Result Value Ref Range    pH 7.14 (L) 7.35 - 7.45      PCO2 56 (H) 35 - 45 mmHg    PO2 176 (H) 80 - 105 mmHg    BICARBONATE 19 (L) 22 - 26 mmol/L    BASE DEFICIT 10.7 (H) 0 - 2 mmol/L    SITE RB     ALLENS TEST NA     MODE PRVC     FIO2 100.0 %    Tidal volume 400.0      RATE 15.0      PEEP/CPAP 5.0      Respiratory comment: Dr Levin Boxer at 4 11 2018 4 10 02 AM. Read back.     METABOLIC PANEL, BASIC    Collection Time: 04/11/18  4:49 AM   Result Value Ref Range    Sodium 147 (H) 136 - 145 mmol/L    Potassium 4.3 3.5 - 5.1 mmol/L    Chloride 109 (H) 98 - 107 mmol/L    CO2 26 21 - 32 mmol/L    Anion gap 12 7 - 16 mmol/L    Glucose 307 (H) 65 - 100 mg/dL    BUN 77 (H) 8 - 23 MG/DL    Creatinine 2.34 (H) 0.8 - 1.5 MG/DL    GFR est AA 35 (L) >60 ml/min/1.73m2    GFR est non-AA 29 (L) >60 ml/min/1.73m2    Calcium 8.0 (L) 8.3 - 10.4 MG/DL   CBC W/O DIFF    Collection Time: 04/11/18  4:49 AM   Result Value Ref Range    WBC 13.2 (H) 4.3 - 11.1 K/uL    RBC 2.69 (L) 4.23 - 5.67 M/uL    HGB 8.5 (L) 13.6 - 17.2 g/dL    HCT 26.4 (L) 41.1 - 50.3 %    MCV 98.1 (H) 79.6 - 97.8 FL    MCH 31.6 26.1 - 32.9 PG    MCHC 32.2 31.4 - 35.0 g/dL    RDW 14.0 11.9 - 14.6 %    PLATELET 814 (L) 636 - 450 K/uL    MPV 11.5 10.8 - 14.1 FL   MAGNESIUM    Collection Time: 04/11/18  4:49 AM   Result Value Ref Range    Magnesium 2.2 1.8 - 2.4 mg/dL   PHOSPHORUS    Collection Time: 04/11/18  4:49 AM   Result Value Ref Range    Phosphorus 6.7 (H) 2.3 - 3.7 MG/DL   TROPONIN I    Collection Time: 04/11/18  4:49 AM   Result Value Ref Range    Troponin-I, Qt. 3.40 (HH) 0.02 - 0.05 NG/ML   BLOOD GAS, ARTERIAL    Collection Time: 04/11/18  6:30 AM   Result Value Ref Range    pH 7.29 (L) 7.35 - 7.45      PCO2 52 (H) 35 - 45 mmHg    PO2 64 (L) 80 - 105 mmHg    BICARBONATE 24 22 - 26 mmol/L    BASE DEFICIT 3.2 (H) 0 - 2 mmol/L    SITE RB     ALLENS TEST NA     MODE PRVC     FIO2 80.0 %    Tidal volume 500.0      PEEP/CPAP 5.0      Respiratory comment: Marjorie HAYWARD at 4 11 2018 6 47 00 AM. Read back. Imaging /Procedures /Studies   CT Chest IMPRESSION  Impression:   1. No evidence of PE.  2. Emphysema. Right greater than left infiltrates and pleural effusions. 3. Indeterminate left upper lobe nodule and right hilar lymph node. 4. Cardiomegaly, atherosclerotic vascular disease. Echocardiogram SUMMARY:  -  Left ventricle: Systolic function was moderately reduced. Ejection   Fraction was estimated in the range of 35 % to 45 %. -  Right ventricle: The ventricle was markedly dilated. Systolic function was  markedly reduced. -  Aortic valve:  Aortic valve is thickened and calcified with possible mild  stenosis (difficult visualization) but does not appear to be severe. There   Was mild regurgitation. ASSESSMENT      Hospital Problems as of 4/11/2018  Never Reviewed          Codes Class Noted - Resolved POA    Pneumothorax on left ICD-10-CM: J93.9  ICD-9-CM: 512.89  4/11/2018 - Present Unknown        Hypotension ICD-10-CM: I95.9  ICD-9-CM: 458.9  4/11/2018 - Present Unknown        Volume overload ICD-10-CM: E87.70  ICD-9-CM: 276.69  4/9/2018 - Present Unknown        Bacteremia ICD-10-CM: R78.81  ICD-9-CM: 790.7  4/7/2018 - Present Unknown        Acute respiratory failure with hypoxia (HCC) ICD-10-CM: J96.01  ICD-9-CM: 518.81  4/6/2018 - Present Unknown        Encephalopathy ICD-10-CM: G93.40  ICD-9-CM: 348.30  4/6/2018 - Present Unknown        Pleural effusion ICD-10-CM: J90  ICD-9-CM: 511.9  4/6/2018 - Present Unknown        Cardiac arrest [facility]) ICD-10-CM: I46.9  ICD-9-CM: 427.5  4/6/2018 - Present Unknown        * (Principal)Pneumonia of right lower lobe due to infectious organism [facility]) ICD-10-CM: J18.1  ICD-9-CM: 092  4/5/2018 - Present Unknown            Cardioplumonary arrest:  Patient underwent another cardiopulmonary arrest this morning. Cardiology following. Appreciate their input and recommendations. Currently on levophed, epi, amiodarone drip. Will attempt to titrate down the levophed first as BPs have been stable. Concern that the epinephrine drip is what is keeping his HR stable at this time. Discussed patient's condition and poor prognosis with pt's son, Casi Dickinson who has been in close communication with his brother, Dr. Sage Bowers, and they agree that the patient should be made DNR at this time. Will continue treatment plan including pressors and intubation, but if he were to deteriorate, we will not code the patient. Tension Pneumothorax:  Chest tube in place and draining serosanguinous fluid. Pulmonology following. Appreciate their management of this.     Acute Hypoxic Resp Failure: Patient is again re-intubated. He is not requiring sedation at this time. Pneumonia: Currently on zosyn. Vanc discontinued yesterday as no MRSA has been isolated on cultures. Acute Tubular Necrosis/Oliguria:  Total 24hr output was 773.2. Nephrology consulted and input appreciated. Creatinine slightly worsened from yesterday, as wound be expected given code this morning. Possible Anoxic Brain injury: EEG shows abnormal findings with evidence of severe encephalopathic process. Metabolic vs degenerative. Pt did receive a banana bag yesterday per Neurology. It is more likely that EEG changes are c/w degenerative change given cardiopulmonary arrests. Neurology and Palliative following. Systolic Congestive Heart Failure:EF of 35-45% . IVF from code discontinued this morning. Hypotension: BPs are stable on levophed. Will attempt to wean today.     Emphysema: seen on CT scan. No prior diagnosis of COPD. Chronic smoker. Cont on solumedrol, duonebs and IV abx.      General symptoms of fatigue and N/V/D: Will cont supportive management and monitor.      Bilateral small pleural effusions. 7mm left upper lobe nodule and right hilar lymph node 1.6 x 1.3 cm: Pulmonary input appreciated. Will need further workup for malignancy, if patient survives hospitalization     Cachexia and malnutrition: Continue NGT and start tube feeds.     Chronic former smoker: Reportedly quit ~ 5 years ago.      DVT ppx:  SQH. Code status: DNR per discussion with patient's sons who are POA. Risk:  high    At least 40 minutes spent in care of patient in ICU. More than 50% of the time spent in face-to-face contact with patient and patient's son.     Meghna Conklin MD

## 2018-04-11 NOTE — PROGRESS NOTES
[facility] CARDIOLOGY PROGRESS NOTE           4/11/2018 11:18 AM    Admit Date: 4/5/2018      Subjective:   Earlier events noted. Patient is intubated on the ventilator. ROS:  GEN:  No fever or chills  Cardiovascular:  As noted above  Pulmonary:  As noted above  Neuro:  No new focal motor or sensory loss    Objective:      Vitals:    04/11/18 0738 04/11/18 0756 04/11/18 0822 04/11/18 0952   BP:       Pulse: 89 77 77 81   Resp: 18 22 22 21   Temp:       SpO2: 99% 100% 100% 99%   Weight:       Height:           Physical Exam:  General-intubated on ventilator. Neck- supple, no JVD  CV- regular rate and rhythm no MRG  Lung- clear anteriorly . Decreased left sided BS  Abd- soft, nondistended  Ext- no edema bilaterally. Skin- warm and dry  Psychiatric:  Unresponsive. .  Neurologic:  Unresponsive. Data Review:   Recent Labs      04/11/18   0449  04/10/18   0432   NA  147*  147*   K  4.3  3.8   MG  2.2  1.7*   BUN  77*  74*   CREA  2.34*  2.14*   GLU  307*  178*   WBC  13.2*  14.1*   HGB  8.5*  11.5*   HCT  26.4*  35.4*   PLT  135*  161       TELEMETRY:  NSR    Assessment/Plan:     Principal Problem:    Pneumonia of right lower lobe due to infectious organism ([facility] [address] (4/5/2018):per primary team.    Active Problems:    Acute respiratory failure with hypoxia ([facility] [address] (4/6/2018)      Encephalopathy (4/6/2018):multifactorial.      Cardiac arrest ([facility] [address] (4/6/2018):x 2:Patient has been made a DNR. I would decrease Amiodarone drip to 0.5 mg/min. Try to taper off Levophed as tolerated. Wean Epi as BP allows. No plans for interventional procedures(ie cardiac  Cath +_PCI). Poor prognosis. Bacteremia (4/7/2018):per primary team.      Pneumothorax on left (4/11/2018):s/p chest tube. Hypotension (4/11/2018):Epi & Levophed inotropic support at present time. Present BP is 105/70.                 Sofia Reid MD  4/11/2018 11:18 AM

## 2018-04-11 NOTE — PROGRESS NOTES
Dr. Chris Leonardo notified of minimal UOP this shift. Nephrology following. No new orders received.

## 2018-04-12 NOTE — PROGRESS NOTES
CM saw patient during rounds. Patient's nurse advised that the patient's son (Leonard) will be meeting with Dr. Sunil Leigh today to discuss whether medical intervention is appropriate. Will continue to follow.

## 2018-04-12 NOTE — PROGRESS NOTES
Ventilator check complete; patient has a #8. 0 ET tube secured at the 26 at the lip. Patient is not sedated. Patient is not able to follow commands. Breath sounds are clear and diminished. Trachea is midline, Positive for subcutaneous air, and chest excursion is symmetric. Patient is also Negative for cyanosis and is Positive for pitting edema. All alarms are set and audible. Resuscitation bag is at the head of the bed.       Ventilator Settings  Mode FIO2 Rate Tidal Volume Pressure PEEP I:E Ratio   PRVC  60 %    500 ml  0 cm H2O  8 cm H20  1:4      Peak airway pressure: 14 cm H2O   Minute ventilation: 6.4 l/min     ABG:   Recent Labs      04/11/18   1810  04/11/18   0630  04/11/18   0350   PH  7.42  7.29*  7.14*   PCO2  38  52*  56*   PO2  79*  64*  176*   HCO3  24  24  19*         Nory See, RT

## 2018-04-12 NOTE — PROGRESS NOTES
Hospitalist Progress Note    2018  Admit Date: 2018  5:31 PM   NAME: Nani Matthew   :  1940   MRN:  [id number]   Attending: Josselin Landa MD  PCP:  Not On File [facility]    SUBJECTIVE:     Nani Matthew is a 70 yo male with unremarkable medical history admitted initially with resp distress, PNA and new left pulm nodule and hilar lymph node on CT scan. Patient has cardiopulmonary arrest immediately on medical floor and required intubation. ROSC was achieved quickly and no medications used. Anesthesiology placed central and arterial line and patient transported to ICU on vent with sedation. Pulmonary consulted for vent management. 4/10 - Patient remains intubated at time of exam, with plans per Pulmonology to attempt extubation today. Patient opens his eyes on verbal command, but does not follow my requests otherwise. Per nursing staff, on evaluation by Dr. Saúl Bullard this morning, patient did follow his commands.  - CODE BLUE called early this morning on patient. See documentation by nocturnist for details. ROSC occurred. Patient did suffer rib fractures and tension pneumothorax with subsequent chest tube placement. He is currently on levophed, epi, and amiodarone drips. His mentation is even more decreased than yesterday. Does not follow commands. Does not hold eye contact. Patient is re-intubated following code blue as well.  - Still unresponsive and on levophed, epi, and amio drips. On initial evaluation at around 0830 this morning, patient remained in similar condition as yesterday without purposeful movement and not on sedation while on vent. Nursing staff paged me at [phone] to alert me that patient's other son, Dr. Nathalia Fuentes and his family had arrived at bedside. I returned to the patient's room and had discussion with family about the patient's condition. Pt's son brought up beginning withdrawal of care, starting with stopping pressors.   Family was adamant that the patient would not want to be on long-term life-sustaining life support and that he would not want to stay in a persistent vegetative state on life support. Decision was made to withdraw care starting with drips, and if the patient tolerates this, to then wean off ventilator. ROS unable to be obtained from patient 2/2 condition. PHYSICAL EXAM       Visit Vitals    /68    Pulse 84    Temp 99.2 °F (37.3 °C)    Resp 22    Ht 6' 1\" (1.854 m)    Wt 68.4 kg (150 lb 12.8 oz)    SpO2 99%    BMI 19.9 kg/m2      Temp (24hrs), Av.7 °F (37.1 °C), Min:97.7 °F (36.5 °C), Max:99.8 °F (37.7 °C)    Oxygen Therapy  O2 Sat (%): 99 % (18 0837)  Pulse via Oximetry: 84 beats per minute (18 0837)  O2 Device: Endotracheal tube (18 0307)  O2 Flow Rate (L/min): 40 l/min (18 0140)  O2 Temperature: 87.8 °F (31 °C) (18 0140)  FIO2 (%): 50 % (18 0743)  ETCO2 (mmHg): 96 mmHg (18 0842)    Intake/Output Summary (Last 24 hours) at 18 0847  Last data filed at 18 0511   Gross per 24 hour   Intake          2958.96 ml   Output              440 ml   Net          2518.96 ml          General: Intubated on vent. Frail and cachetic  Head:  Atraumatic Normocephalic. Eyes:  Pupils reactive bilaterally  ENT:  No discharges/lesions. Lungs:  Coarse and crackles bilaterally. Chest tube in L lateral chest draining serosanguinous fluid. Crepitus present extending to neck, L arm, L abdomen from L chest  CVS:  Regular rate and rhythm, no murmur, rub, or gallop, no JVD  Abdomen: Thin, Slightly firm with mild abdominal wall edema, Positive bowel sounds. :  Scrotal and penile edema, no erythema, beebe in place  Neurologic:  Eyes open. Does not focus on my gaze. Does not respond to my commands.  No spontaneous limb movement during examination        Recent Results (from the past 24 hour(s))   BLOOD GAS, ARTERIAL    Collection Time: 18  6:10 PM   Result Value Ref Range pH 7.42 7.35 - 7.45      PCO2 38 35 - 45 mmHg    PO2 79 (L) 80 - 105 mmHg    BICARBONATE 24 22 - 26 mmol/L    BASE EXCESS 0.0 0 - 3 mmol/L    SITE LR     ALLENS TEST POSITIVE      MODE PRVC     Tidal volume 500.0      RATE 15.0      PEEP/CPAP 8.0      Respiratory comment: MAINOR Rasheed at 4 11 2018 6 17 47 PM. Read back. BLOOD GAS, ARTERIAL    Collection Time: 04/12/18  3:25 AM   Result Value Ref Range    pH 7.45 7.35 - 7.45      PCO2 32 (L) 35 - 45 mmHg    PO2 78 (L) 80 - 105 mmHg    BICARBONATE 22 22 - 26 mmol/L    BASE DEFICIT 1.1 0 - 2 mmol/L    SITE RR     ALLENS TEST POSITIVE      MODE PRVC     Tidal volume 500.0      RATE 15.0      PEEP/CPAP 8.0      Respiratory comment:       Denece Moritz RN at 4 12 2018 3 35 40 AM. Read back. METABOLIC PANEL, BASIC    Collection Time: 04/12/18  4:45 AM   Result Value Ref Range    Sodium 142 136 - 145 mmol/L    Potassium 4.2 3.5 - 5.1 mmol/L    Chloride 106 98 - 107 mmol/L    CO2 26 21 - 32 mmol/L    Anion gap 10 7 - 16 mmol/L    Glucose 236 (H) 65 - 100 mg/dL    BUN 91 (H) 8 - 23 MG/DL    Creatinine 2.75 (H) 0.8 - 1.5 MG/DL    GFR est AA 29 (L) >60 ml/min/1.73m2    GFR est non-AA 24 (L) >60 ml/min/1.73m2    Calcium 8.0 (L) 8.3 - 10.4 MG/DL   CBC W/O DIFF    Collection Time: 04/12/18  4:45 AM   Result Value Ref Range    WBC 17.6 (H) 4.3 - 11.1 K/uL    RBC 2.59 (L) 4.23 - 5.67 M/uL    HGB 8.2 (L) 13.6 - 17.2 g/dL    HCT 24.4 (L) 41.1 - 50.3 %    MCV 94.2 79.6 - 97.8 FL    MCH 31.7 26.1 - 32.9 PG    MCHC 33.6 31.4 - 35.0 g/dL    RDW 13.9 11.9 - 14.6 %    PLATELET 095 380 - 734 K/uL    MPV 12.2 10.8 - 14.1 FL   PHOSPHORUS    Collection Time: 04/12/18  4:45 AM   Result Value Ref Range    Phosphorus 4.2 (H) 2.3 - 3.7 MG/DL         Imaging /Procedures /Studies   CT Chest IMPRESSION  Impression:   1. No evidence of PE.  2. Emphysema. Right greater than left infiltrates and pleural effusions. 3. Indeterminate left upper lobe nodule and right hilar lymph node.   4. Cardiomegaly, atherosclerotic vascular disease. Echocardiogram SUMMARY:  -  Left ventricle: Systolic function was moderately reduced. Ejection   Fraction was estimated in the range of 35 % to 45 %. -  Right ventricle: The ventricle was markedly dilated. Systolic function was  markedly reduced. -  Aortic valve: Aortic valve is thickened and calcified with possible mild  stenosis (difficult visualization) but does not appear to be severe. There   Was mild regurgitation. ASSESSMENT      Hospital Problems as of 4/12/2018  Never Reviewed          Codes Class Noted - Resolved POA    Pneumothorax on left ICD-10-CM: J93.9  ICD-9-CM: 512.89  4/11/2018 - Present Unknown        Hypotension ICD-10-CM: I95.9  ICD-9-CM: 458.9  4/11/2018 - Present Unknown        Volume overload ICD-10-CM: E87.70  ICD-9-CM: 276.69  4/9/2018 - Present Unknown        Bacteremia ICD-10-CM: R78.81  ICD-9-CM: 790.7  4/7/2018 - Present Unknown        Acute respiratory failure with hypoxia (HCC) ICD-10-CM: J96.01  ICD-9-CM: 518.81  4/6/2018 - Present Unknown        Encephalopathy ICD-10-CM: G93.40  ICD-9-CM: 348.30  4/6/2018 - Present Unknown        Pleural effusion ICD-10-CM: J90  ICD-9-CM: 511.9  4/6/2018 - Present Unknown        Cardiac arrest [facility]) ICD-10-CM: I46.9  ICD-9-CM: 427.5  4/6/2018 - Present Unknown        * (Principal)Pneumonia of right lower lobe due to infectious organism [facility]) ICD-10-CM: J18.1  ICD-9-CM: 545  4/5/2018 - Present Unknown            Cardiopulmonary arrest:  2 cardiopulmonary arrests while hospitalized. On levophed, epi, amio. Per POA's request, with start withdrawing care. Will stop levophed, epi, and amio now. Patient remains DNR. Tension Pneumothorax:  Chest tube in place and draining serosanguinous fluid. Pulmonology following. Acute Hypoxic Resp Failure: Patient is again re-intubated. He is not requiring sedation at this time.   If patient tolerates discontinuing of pressors, will attempt to wean off vent today as well. Pneumonia: Currently on zosyn. Family okay with continuing abx for the time being. Acute Tubular Necrosis/Oliguria:  Total 24hr output was 484. Oliguria continues to worsen as expected after 2 cardiopulmonary arrests. Appreciate Nephrology following. As noted, pt is not a dialysis candidate as it would not improve his condition. Possible Anoxic Brain injury: EEG shows abnormal findings with evidence of severe encephalopathic process. Metabolic vs degenerative. It is more likely that EEG changes are c/w degenerative change given cardiopulmonary arrests. Neurology and Palliative following. Poor prognosis. Withdrawing care. Systolic Congestive Heart Failure:EF of 35-45% . Can continue gentle IVFs if needed as withdrawing care. Hypotension: BPs have been stable on levophed. Discontinuing drips now.     Emphysema: seen on CT scan. No prior diagnosis of COPD. Chronic smoker. Cont on solumedrol, duonebs and IV abx.      Bilateral small pleural effusions. 7mm left upper lobe nodule and right hilar lymph node 1.6 x 1.3 cm: Pulmonary input appreciated. Will need further workup for malignancy, if patient survives hospitalization, which appears bleak at this time.     Cachexia and malnutrition: Continue NGT and continue tube feeds per family request as we begin to withdraw care.     Chronic former smoker: Reportedly quit ~ 5 years ago.      DVT ppx:  SQH. Code status: DNR per discussion with patient's sons who are POA. Risk:  high    Starting process of withdrawal of care. Discontinued pressors. Will wean off vent if patient survives discontinuation of pressors. At least 40 minutes spent in care of patient in ICU. More than 50% of the time spent in face-to-face contact with patient and patient's son, daughter-in-law, granddaughter, and grandson.     Carlos Yo MD

## 2018-04-12 NOTE — DISCHARGE SUMMARY
Hospitalist Discharge Summary     Patient ID:  Hung Morrison  [id number]  89 y.o.  1940  Admit date: 4/5/2018  5:31 PM  Discharge date and time: 4/12/2018  Attending: Yris Pemberton MD  PCP:  Not On File Bsi  Treatment Team: Attending Provider: Yris Pemberton MD; Consulting Provider: Kaleb Balderas MD; Consulting Provider: Samm Harris MD; Care Manager: Sally Randle; Consulting Provider: Gustavo Love MD; Consulting Provider: Shereen Williamson MD; Consulting Provider: Deshawn Cm NP; Utilization Review: Jarret Yo RN    Principal Diagnosis Pneumonia of right lower lobe due to infectious organism [facility])   Principal Problem:    Pneumonia of right lower lobe due to infectious organism ([address] (4/5/2018)    Active Problems:    Acute respiratory failure with hypoxia ([address] (4/6/2018)      Encephalopathy (4/6/2018)      Pleural effusion (4/6/2018)      Cardiac arrest ([address] (4/6/2018)      Bacteremia (4/7/2018)      Volume overload (4/9/2018)      Pneumothorax on left (4/11/2018)      Hypotension (4/11/2018)             Hospital Course:  Please refer to the admission H&P for details of presentation. In summary, the patient was a 66yoM with unremarkable medical history who was initially admitted with respiratory distress on 4/5/18 found to have evidence of pneumonia, L pulmonary nodule/hilar lymph node. He was admitted. Immediately upon arrival to the medical floor, he went into cardiopulmonary arrest and required intubation. He remained in the ICU receiving antibiotics for pneumonia. Pulmonology followed throughout his admission. Cardiology was consulted given his cardiopulmonary arrest, and they followed as well. Nephrology was consulted as the patient was oliguric s/p arrest.    Unfortunately, the patient did not improve during his stay and suffered another cardiopulmonary arrest on the morning of 4/11/2018. His condition significantly worsened.   He appeared to have severe, sustained neurologic deficits. Neurology followed the patient as well and EEG performed prior to his 2nd arrest was severely abnormal as well. Family gathered from out of town again on 4/11-4/12, and decision was made, given the patient's very poor prognosis, to start withdrawing care. Pressors were stopped. Subsequently, patient was extubated. Time of death was [id number]. I was called to bedside by nursing staff and pronounced at [id number] [id number]. Significant Diagnostic Studies:       Labs: Results:       Chemistry Recent Labs      04/12/18   0445  04/11/18   0449  04/10/18   0432   GLU  236*  307*  178*   NA  142  147*  147*   K  4.2  4.3  3.8   CL  106  109*  109*   CO2  26  26  29   BUN  91*  77*  74*   CREA  2.75*  2.34*  2.14*   CA  8.0*  8.0*  7.9*   AGAP  10  12  9      CBC w/Diff Recent Labs      04/12/18   0445  04/11/18   0449  04/10/18   0432   WBC  17.6*  13.2*  14.1*   RBC  2.59*  2.69*  3.64*   HGB  8.2*  8.5*  11.5*   HCT  24.4*  26.4*  35.4*   PLT  164  135*  161      Cardiac Enzymes No results for input(s): CPK, CKND1, ALBERT in the last 72 hours. No lab exists for component: CKRMB, TROIP   Coagulation No results for input(s): PTP, INR, APTT in the last 72 hours. No lab exists for component: INREXT    Lipid Panel No results found for: CHOL, CHOLPOCT, CHOLX, CHLST, CHOLV, 345392, HDL, LDL, LDLC, DLDLP, 881235, VLDLC, VLDL, TGLX, TRIGL, TRIGP, TGLPOCT, CHHD, CHHDX   BNP No results for input(s): BNPP in the last 72 hours. Liver Enzymes No results for input(s): TP, ALB, TBIL, AP, SGOT, GPT in the last 72 hours.     No lab exists for component: DBIL   Thyroid Studies No results found for: T4, T3U, TSH, TSHEXT         Death Exam:  Pupillary reflex absent  Corneal reflex absent  Auscultation of heart was without HR for 1 minute  No respirations for 1 minute  No pain response  No peripheral pulses able to be palpated    Patient noted to be apneic and asystolic on cardiac telemetry monitoring    SC Masoud Santo will be completed    Time spent to discharge patient 35 minutes  Signed:  Zak Lora MD  4/12/2018  6:35 PM

## 2018-04-12 NOTE — PROGRESS NOTES
Interdisciplinary team rounds were held 4/12/2018 with the following team members:Care Management, Nursing and Physical Therapy. Plan of care discussed. See clinical pathway and/or care plan for interventions and desired outcomes.

## 2018-04-12 NOTE — PROGRESS NOTES
Problem: Nutrition Deficit  Goal: *Optimize nutritional status  Nutrition Follow Up:  Reason for initial assessment: Consult for TF management per nutritional support protocols per Dr. Adah Kawasaki. Malnutrition screening tool trigger for weight loss; unknown amount of weight loss and eating poorly r/t decreased appetite. Assessment:  Current Diet:  NPO   EN:  Jevity 1.2 at 50 ml/hr with 25 ml/hr water flush  FT in place:  Abdomen soft, non tender with positive bowel sounds. Date of Last BM: 4/11  Pt intubated, unresponsive. Tube feeding initiated 4/07; pt tolerating  tube feeding of Jevity 1.2 at 50 ml/hr; no residuals noted. Family has decided to withdraw care, starting with pressors; family requests to continue with tube feeding. Extremities:  1 + LL edema  Pertinent Medications:  Zosyn, SoluMedrol  Pertinent Rx:  Hypernatremia-resolved, blood sugar 236-on solumedrol, Hyperphosphatemia 4.2, Worsening renal function  Anthropometrics:Height: 6' 1\" (185.4 cm), Weight Source: Bed, Weight: 63.7 kg (140 lb 8 oz), Body mass index is 18.54 kg/(m^2). BMI class of Underweight for Older American Male.  Pt currently at 76% IBW. Weight:  4/12: 68.4 kg-bed weight  Macronutrient needs:  EER:  3821-6350 kcal /day (28-32 kcal/kg BW)  EPR:  64-76 grams protein/day (1-1.2 grams/kg BW) GFR 32  Max CHO:  Max 255 grams/day (50% kcal)  Fluid:  1ml/kcal  Intake/Comparative standards: Current NPO status does not meet kcal or protein needs. Current tube feeding meets 81% estimated kcal needs and 100% estimated protein needs. Intervention:  Meals and snacks: NPO; advance per MD recommendations  EN:  Continue TF of Jevity 1.2 at 50 ml/hr and 20 ml/hr water flush.  Tube feeding /water flushes to provide 1440 kcal/day (81% of needs), 67 grams protein/day (100% of needs), 203 grams CHO/day (does not exceed max CHO),  and ~ 1448 ml free water/day (100% of needs).   Nutrition Supplement Therapy: Electrolyte replacement per nutritional support protocols are active on MAR.   Labs: BMP daily, Jax Hernandez MWF  Coordination of nutrition care:  Interdisciplinary Rounds  Discharge nutrition plan: Too soon to determine  Carolyn Rees, RD, LD, MPH  [phone]  Dustin Smart

## 2018-04-12 NOTE — PROGRESS NOTES
Care Daily Progress Note: 4/12/2018  Admission Date: 4/5/2018     The patient's chart is reviewed and the patient is discussed with the staff. 75yo WM presented with cough, dyspnea, n/v/d. CT with mild infiltrates, R effusion. Admitted for CAP on 4/5. Arrested shortly after arriving to floor with brief compressions, intubation, and ROSC. He tolerated  cpap trial yesterday but was not extubated due to an increase in hr after suctioning  He was extubated yesterday and did well unitl 2 am when he coded again and cpr was done  3 separate times for the next hour. He developed ptx from cpr and chest tube placed on R.   Pt is currently intubated and on vent-crtically ill on epi  Drip and amio    Subjective:   Still on vent  No change  Still no multiple presors      Current Facility-Administered Medications   Medication Dose Route Frequency    amiodarone (CORDARONE) 450 mg in dextrose 5% 250 mL infusion  0.5-1 mg/min IntraVENous TITRATE    EPINEPHrine (ADRENALIN) 4 mg in 0.9% sodium chloride 250 mL infusion  1-10 mcg/min IntraVENous TITRATE    EPINEPHrine (ADRENALIN) 0.1 mg/mL syringe    CODE BLUE    sodium bicarbonate 8.4 % (1 mEq/mL) injection   IntraVENous CODE BLUE    calcium chloride injection   IntraVENous CODE BLUE    amiodarone (CORDARONE) injection   IntraVENous CODE BLUE    lidocaine (PF) (XYLOCAINE) 100 mg/5 mL (2 %) injection syringe    CODE BLUE    aspirin (ASPIRIN) tablet 325 mg  325 mg Nasogastric DAILY    metoprolol tartrate (LOPRESSOR) tablet 12.5 mg  12.5 mg Per NG tube BID    methylPREDNISolone (PF) (SOLU-MEDROL) injection 40 mg  40 mg IntraVENous DAILY    NUTRITIONAL SUPPORT ELECTROLYTE PRN ORDERS   Does Not Apply PRN    timolol (TIMOPTIC) 0.5 % ophthalmic solution 1 Drop  1 Drop Both Eyes DAILY    morphine injection 2 mg  2 mg IntraVENous Q3H PRN    white Petrolatum-Mineral Oil (AKWA TEARS) ophthalmic ointment   Both Eyes PRN    piperacillin-tazobactam (ZOSYN) 3.375 g in 0.9% sodium chloride (MBP/ADV) 100 mL  3.375 g IntraVENous Q8H    pantoprazole (PROTONIX) 40 mg in sodium chloride 0.9% 10 mL injection  40 mg IntraVENous DAILY    sodium chloride (NS) flush 5-10 mL  5-10 mL IntraVENous Q8H    sodium chloride (NS) flush 5-10 mL  5-10 mL IntraVENous PRN    acetaminophen (TYLENOL) tablet 650 mg  650 mg Oral Q4H PRN    HYDROcodone-acetaminophen (NORCO) 5-325 mg per tablet 1 Tab  1 Tab Oral Q4H PRN    ondansetron (ZOFRAN) injection 4 mg  4 mg IntraVENous Q4H PRN    heparin (porcine) injection 5,000 Units  5,000 Units SubCUTAneous Q8H    albuterol-ipratropium (DUO-NEB) 2.5 MG-0.5 MG/3 ML  3 mL Nebulization Q4H PRN    NOREPINephrine (LEVOPHED) 4 mg in 5% dextrose 250 mL infusion  2-30 mcg/min IntraVENous TITRATE    propofol (DIPRIVAN) infusion  0-50 mcg/kg/min IntraVENous TITRATE       Review of Systems   Unobtainable due to patient status. Objective:     Vitals:    04/12/18 0652 04/12/18 0707 04/12/18 0722 04/12/18 0743   BP: 121/68 124/64 126/66    Pulse: 83 84 84 85   Resp: 21 12 26 20   Temp:       SpO2: 100% 100% 100% 100%   Weight:       Height:           Intake and Output:   04/10 1901 - 04/12 0700  In: 4818.2 [I.V.:4067.2]  Out: 3083 [Urine:730]       Physical Exam:          Constitutional:  intubated and mechanically ventilated.   EENMT:  Sclera clear, pupils equal, oral mucosa moist  Respiratory: crepitus, crackles  Cardiovascular:  RRR with no M,G,R;  Gastrointestinal:  soft with no tenderness; positive bowel sounds present  Musculoskeletal:  warm with no cyanosis, 1+ lower leg edema  Skin:  no jaundice or ecchymosis  Neurologic: no gross neuro deficits     Psychiatric:   Responds only to pain    LINES:  ETT, central line    DRIPS:  Epi, levophed, amio    CXR:        Ventilator Settings  Mode FIO2 Rate Tidal Volume Pressure PEEP   PRVC  50 %    500 ml  0 cm H2O  8 cm H20      Peak airway pressure: 19 cm H2O   Minute ventilation: 10 l/min     ABG:   Recent Labs      04/12/18   0325  04/11/18   1810  04/11/18   0630   PH  7.45  7.42  7.29*   PCO2  32*  38  52*   PO2  78*  79*  64*   HCO3  22  24  24        LAB  No results for input(s): GLUCPOC in the last 72 hours. No lab exists for component: [place]  Recent Labs      04/12/18   0445  04/11/18   0449  04/10/18   0432   WBC  17.6*  13.2*  14.1*   HGB  8.2*  8.5*  11.5*   HCT  24.4*  26.4*  35.4*   PLT  164  135*  161     Recent Labs      04/12/18   0445  04/11/18   0449  04/10/18   0432   NA  142  147*  147*   K  4.2  4.3  3.8   CL  106  109*  109*   CO2  26  26  29   GLU  236*  307*  178*   BUN  91*  77*  74*   CREA  2.75*  2.34*  2.14*   MG   --   2.2  1.7*   PHOS  4.2*  6.7*  3.9*   CA  8.0*  8.0*  7.9*     No results for input(s): LCAD, LAC in the last 72 hours.       Assessment:  (Medical Decision Making)     Hospital Problems  Never Reviewed          Codes Class Noted POA    Pneumothorax on left ICD-10-CM: J93.9  ICD-9-CM: 512.89  4/11/2018 Unknown    Post cpr    Hypotension ICD-10-CM: I95.9  ICD-9-CM: 458.9  4/11/2018 Unknown    Critically ill    Volume overload ICD-10-CM: E87.70  ICD-9-CM: 276.69  4/9/2018 Unknown        Bacteremia ICD-10-CM: R78.81  ICD-9-CM: 790.7  4/7/2018 Unknown        Acute respiratory failure with hypoxia (HCC) ICD-10-CM: J96.01  ICD-9-CM: 518.81  4/6/2018 Unknown    Back on vent post arrest - on 90% fio2-critically ill    Encephalopathy ICD-10-CM: G93.40  ICD-9-CM: 348.30  4/6/2018 Unknown    anoxic    Pleural effusion ICD-10-CM: J90  ICD-9-CM: 511.9  4/6/2018 Unknown    small    Cardiac arrest [facility]) ICD-10-CM: I46.9  ICD-9-CM: 427.5  4/6/2018 Unknown    x2    * (Principal)Pneumonia of right lower lobe due to infectious organism [facility]) ICD-10-CM: J18.1  ICD-9-CM: 843  4/5/2018 Unknown              Plan:  (Medical Decision Making)    continue full support for now although he is DNR now and one other son is coming  -CT  -wean pressors if can   Continue ABX  - prognosis guarded   -PC following    Critical care time 46 minutes  More than 50% of the time documented was spent in face-to-face contact with the patient and in the care of the patient on the floor/unit where the patient is located.     Lyudmila Albert MD

## 2018-04-12 NOTE — PROGRESS NOTES
Ventilator check complete; patient has a #8. 0 ET tube secured at the 26 at the teeth. Patient is not sedated. Patient is not able to follow commands. Breath sounds are diminished. Trachea is midline, Negative for subcutaneous air, and chest excursion is symmetric. Patient is also Negative for cyanosis and is Negative for pitting edema. All alarms are set and audible. Resuscitation bag is at the head of the bed.       Ventilator Settings  Mode FIO2 Rate Tidal Volume Pressure PEEP I:E Ratio   PRVC  50 %  15  500 ml    8 cm H20  1:3      Peak airway pressure: 19 cm H2O   Minute ventilation: 10 l/min     ABG:   Recent Labs      04/12/18   0325  04/11/18   1810  04/11/18   0630   PH  7.45  7.42  7.29*   PCO2  32*  38  52*   PO2  78*  79*  64*   HCO3  22  24  24         Stella Acevedo

## 2018-04-12 NOTE — PROGRESS NOTES
Respiratory Therapy Student was appropriately supervised during Procedure(s) by Respiratory Therapist    Kaley Contreras

## 2018-04-13 LAB
BACTERIA SPEC CULT: NORMAL
BACTERIA SPEC CULT: NORMAL
CALCULATED P AXIS, ECG09: 84 DEGREES
CALCULATED R AXIS, ECG10: 86 DEGREES
CALCULATED T AXIS, ECG11: 64 DEGREES
DIAGNOSIS, 93000: NORMAL
P-R INTERVAL, ECG05: 138 MS
Q-T INTERVAL, ECG07: 304 MS
QTC CALCULATION (BEZET), ECG08: 428 MS
SERVICE CMNT-IMP: NORMAL
SERVICE CMNT-IMP: NORMAL
VENTRICULAR RATE, ECG03: 119 BPM
